# Patient Record
Sex: FEMALE | Race: BLACK OR AFRICAN AMERICAN | NOT HISPANIC OR LATINO | URBAN - METROPOLITAN AREA
[De-identification: names, ages, dates, MRNs, and addresses within clinical notes are randomized per-mention and may not be internally consistent; named-entity substitution may affect disease eponyms.]

---

## 2021-11-24 ENCOUNTER — PATIENT MESSAGE (OUTPATIENT)
Dept: INTERNAL MEDICINE | Facility: CLINIC | Age: 36
End: 2021-11-24
Payer: MEDICAID

## 2021-12-06 ENCOUNTER — PATIENT MESSAGE (OUTPATIENT)
Dept: INTERNAL MEDICINE | Facility: CLINIC | Age: 36
End: 2021-12-06
Payer: MEDICAID

## 2022-08-29 ENCOUNTER — OUTSIDE PLACE OF SERVICE (OUTPATIENT)
Dept: URGENT CARE | Facility: CLINIC | Age: 37
End: 2022-08-29
Payer: MEDICAID

## 2022-08-29 DIAGNOSIS — U07.1 COVID-19: Primary | ICD-10-CM

## 2022-08-29 DIAGNOSIS — R05.1 ACUTE COUGH: ICD-10-CM

## 2022-08-29 PROCEDURE — 99213 OFFICE O/P EST LOW 20 MIN: CPT | Mod: 95,,, | Performed by: NURSE PRACTITIONER

## 2022-08-29 PROCEDURE — 99213 PR OFFICE/OUTPT VISIT, EST, LEVL III, 20-29 MIN: ICD-10-PCS | Mod: 95,,, | Performed by: NURSE PRACTITIONER

## 2023-04-13 ENCOUNTER — TELEPHONE (OUTPATIENT)
Dept: OBSTETRICS AND GYNECOLOGY | Facility: CLINIC | Age: 38
End: 2023-04-13
Payer: COMMERCIAL

## 2023-04-13 NOTE — TELEPHONE ENCOUNTER
Called pt confirmed pt identifiers, pt stated she had a pregnancy confirmation at [facility] in women's health, stated she had an appointment but it was cancelled b/c a nurse told her she would need her paperwork before having her initial ob appointment, pt stated she now has her paperwork and wanted to know if there was anything else she needed to bring informed pt that if there is any additional paperwork the provider may need she will let her know.pt verbalized understanding

## 2023-04-14 ENCOUNTER — INITIAL PRENATAL (OUTPATIENT)
Dept: OBSTETRICS AND GYNECOLOGY | Facility: CLINIC | Age: 38
End: 2023-04-14
Payer: COMMERCIAL

## 2023-04-14 ENCOUNTER — PATIENT MESSAGE (OUTPATIENT)
Dept: ADMINISTRATIVE | Facility: OTHER | Age: 38
End: 2023-04-14
Payer: COMMERCIAL

## 2023-04-14 VITALS — WEIGHT: 126.31 LBS | SYSTOLIC BLOOD PRESSURE: 100 MMHG | DIASTOLIC BLOOD PRESSURE: 74 MMHG

## 2023-04-14 DIAGNOSIS — R39.15 URGENCY OF URINATION: ICD-10-CM

## 2023-04-14 DIAGNOSIS — O09.522 AMA (ADVANCED MATERNAL AGE) MULTIGRAVIDA 35+, SECOND TRIMESTER: Primary | ICD-10-CM

## 2023-04-14 DIAGNOSIS — Z36.89 ENCOUNTER FOR FETAL ANATOMIC SURVEY: ICD-10-CM

## 2023-04-14 DIAGNOSIS — Z3A.19 19 WEEKS GESTATION OF PREGNANCY: ICD-10-CM

## 2023-04-14 DIAGNOSIS — Z87.440 HISTORY OF UTI: ICD-10-CM

## 2023-04-14 PROCEDURE — 0500F PR INITIAL PRENATAL CARE VISIT: ICD-10-PCS | Mod: CPTII,S$GLB,, | Performed by: MIDWIFE

## 2023-04-14 PROCEDURE — 87086 URINE CULTURE/COLONY COUNT: CPT | Performed by: MIDWIFE

## 2023-04-14 PROCEDURE — 0500F INITIAL PRENATAL CARE VISIT: CPT | Mod: CPTII,S$GLB,, | Performed by: MIDWIFE

## 2023-04-14 PROCEDURE — 99999 PR PBB SHADOW E&M-EST. PATIENT-LVL II: CPT | Mod: PBBFAC,,, | Performed by: MIDWIFE

## 2023-04-14 PROCEDURE — 99999 PR PBB SHADOW E&M-EST. PATIENT-LVL II: ICD-10-PCS | Mod: PBBFAC,,, | Performed by: MIDWIFE

## 2023-04-14 RX ORDER — PHENAZOPYRIDINE HYDROCHLORIDE 200 MG/1
200 TABLET, FILM COATED ORAL
Qty: 20 TABLET | Refills: 0 | Status: SHIPPED | OUTPATIENT
Start: 2023-04-14 | End: 2023-04-21

## 2023-04-14 RX ORDER — ONDANSETRON 4 MG/1
4 TABLET, ORALLY DISINTEGRATING ORAL EVERY 8 HOURS PRN
Status: ON HOLD | COMMUNITY
Start: 2023-03-18 | End: 2023-09-16 | Stop reason: HOSPADM

## 2023-04-14 RX ORDER — ALPRAZOLAM 0.25 MG/1
0.25 TABLET ORAL
COMMUNITY
Start: 2022-11-23 | End: 2023-05-30

## 2023-04-14 RX ORDER — ASPIRIN 81 MG/1
81 TABLET ORAL
Status: ON HOLD | COMMUNITY
Start: 2023-04-04 | End: 2023-09-16 | Stop reason: HOSPADM

## 2023-04-14 NOTE — PROGRESS NOTES
Pt transfer from [facility], Dr Karin Joyner, records in media, GC/CT neg, Genetic screening neg, Hep C neg, PAPnormal, HPV neg, , B+, 11.3/34.9, RPR-NR, Rubella-positive, hep B neg:  Pt c/o of urinary urgency and pain, she went to the office and was told she probably had a kidney stone and should go to the assessment center, pt told she did not have a kidney stone that she had a UTI, took meds but is still feeling pain, it got better for a while but is feeling bad again, pyridium given and urine culture sent, pt has not had her anatomy scan yet, will schedule in the next 2 weeks    
detailed exam

## 2023-04-16 LAB
BACTERIA UR CULT: NORMAL
BACTERIA UR CULT: NORMAL

## 2023-04-18 ENCOUNTER — PATIENT MESSAGE (OUTPATIENT)
Dept: OTHER | Facility: OTHER | Age: 38
End: 2023-04-18
Payer: COMMERCIAL

## 2023-04-19 ENCOUNTER — PATIENT MESSAGE (OUTPATIENT)
Dept: OBSTETRICS AND GYNECOLOGY | Facility: CLINIC | Age: 38
End: 2023-04-19
Payer: COMMERCIAL

## 2023-04-19 ENCOUNTER — PATIENT MESSAGE (OUTPATIENT)
Dept: OTHER | Facility: OTHER | Age: 38
End: 2023-04-19
Payer: COMMERCIAL

## 2023-04-19 DIAGNOSIS — R82.71 BACTERIURIA: Primary | ICD-10-CM

## 2023-04-19 RX ORDER — NITROFURANTOIN 25; 75 MG/1; MG/1
100 CAPSULE ORAL 2 TIMES DAILY
Qty: 14 CAPSULE | Refills: 0 | Status: SHIPPED | OUTPATIENT
Start: 2023-04-19 | End: 2023-04-26

## 2023-05-01 ENCOUNTER — PROCEDURE VISIT (OUTPATIENT)
Dept: OBSTETRICS AND GYNECOLOGY | Facility: CLINIC | Age: 38
End: 2023-05-01
Payer: COMMERCIAL

## 2023-05-01 ENCOUNTER — ROUTINE PRENATAL (OUTPATIENT)
Dept: OBSTETRICS AND GYNECOLOGY | Facility: CLINIC | Age: 38
End: 2023-05-01
Payer: COMMERCIAL

## 2023-05-01 VITALS — WEIGHT: 131.81 LBS | DIASTOLIC BLOOD PRESSURE: 72 MMHG | SYSTOLIC BLOOD PRESSURE: 108 MMHG

## 2023-05-01 DIAGNOSIS — Z36.89 ENCOUNTER FOR FETAL ANATOMIC SURVEY: ICD-10-CM

## 2023-05-01 DIAGNOSIS — Z3A.21 21 WEEKS GESTATION OF PREGNANCY: ICD-10-CM

## 2023-05-01 PROCEDURE — 0502F PR SUBSEQUENT PRENATAL CARE: ICD-10-PCS | Mod: CPTII,S$GLB,, | Performed by: ADVANCED PRACTICE MIDWIFE

## 2023-05-01 PROCEDURE — 0502F SUBSEQUENT PRENATAL CARE: CPT | Mod: CPTII,S$GLB,, | Performed by: ADVANCED PRACTICE MIDWIFE

## 2023-05-01 PROCEDURE — 76811 US OB/GYN PROCEDURE (VIEWPOINT): ICD-10-PCS | Mod: S$GLB,,, | Performed by: OBSTETRICS & GYNECOLOGY

## 2023-05-01 PROCEDURE — 76811 OB US DETAILED SNGL FETUS: CPT | Mod: S$GLB,,, | Performed by: OBSTETRICS & GYNECOLOGY

## 2023-05-01 PROCEDURE — 99999 PR PBB SHADOW E&M-EST. PATIENT-LVL III: CPT | Mod: PBBFAC,,, | Performed by: ADVANCED PRACTICE MIDWIFE

## 2023-05-01 PROCEDURE — 99999 PR PBB SHADOW E&M-EST. PATIENT-LVL III: ICD-10-PCS | Mod: PBBFAC,,, | Performed by: ADVANCED PRACTICE MIDWIFE

## 2023-05-01 NOTE — PROGRESS NOTES
37 y.o. female  at 21w5d   feeling flutters/FM, denies VB, LOF or cramping  Doing well without concerns   Reviewed anatomy US-normal fetal anatomy with no obvious abnormalities.  S=D. Normal amniotic fluid, normal placental location in posterior position, no evidence of previa. Normal appearing cervical length at 48.5. male gender. 3VC. EFW 30 %tile.   Genetic testing negative    21 weeks gestation of pregnancy    Baby Friendly Practices reviewed   Reviewed warning signs, normal FM,  labor precautions and how/when to call.  RTC x 4 wks, call or present sooner prn.

## 2023-05-02 PROBLEM — D25.9 UTERINE FIBROID DURING PREGNANCY, ANTEPARTUM: Status: ACTIVE | Noted: 2023-05-02

## 2023-05-02 PROBLEM — O34.10 UTERINE FIBROID DURING PREGNANCY, ANTEPARTUM: Status: ACTIVE | Noted: 2023-05-02

## 2023-05-17 ENCOUNTER — PATIENT MESSAGE (OUTPATIENT)
Dept: OTHER | Facility: OTHER | Age: 38
End: 2023-05-17
Payer: COMMERCIAL

## 2023-05-30 ENCOUNTER — ROUTINE PRENATAL (OUTPATIENT)
Dept: OBSTETRICS AND GYNECOLOGY | Facility: CLINIC | Age: 38
End: 2023-05-30
Payer: COMMERCIAL

## 2023-05-30 VITALS — DIASTOLIC BLOOD PRESSURE: 68 MMHG | WEIGHT: 139.75 LBS | SYSTOLIC BLOOD PRESSURE: 110 MMHG

## 2023-05-30 DIAGNOSIS — O09.522 AMA (ADVANCED MATERNAL AGE) MULTIGRAVIDA 35+, SECOND TRIMESTER: Primary | ICD-10-CM

## 2023-05-30 PROCEDURE — 99999 PR PBB SHADOW E&M-EST. PATIENT-LVL III: CPT | Mod: PBBFAC,,, | Performed by: ADVANCED PRACTICE MIDWIFE

## 2023-05-30 PROCEDURE — 0502F SUBSEQUENT PRENATAL CARE: CPT | Mod: CPTII,S$GLB,, | Performed by: ADVANCED PRACTICE MIDWIFE

## 2023-05-30 PROCEDURE — 0502F PR SUBSEQUENT PRENATAL CARE: ICD-10-PCS | Mod: CPTII,S$GLB,, | Performed by: ADVANCED PRACTICE MIDWIFE

## 2023-05-30 PROCEDURE — 99999 PR PBB SHADOW E&M-EST. PATIENT-LVL III: ICD-10-PCS | Mod: PBBFAC,,, | Performed by: ADVANCED PRACTICE MIDWIFE

## 2023-05-30 RX ORDER — FLUOXETINE HYDROCHLORIDE 40 MG/1
1 CAPSULE ORAL DAILY
COMMUNITY
Start: 2022-06-14 | End: 2023-06-20

## 2023-05-30 NOTE — PROGRESS NOTES
37 y.o. female  at 25w6d   Reports + FM, denies VB, LOF, or cramping  Doing well without concerns. Rx sent for prenatal vitamin       TW lbs since last visit   Discussed feeding options: breast   Breast pump Rx:  Done   Encouraged patient to attend [facility]s Prenatal Breastfeeding Class.    Reviewed upcoming 28wk labs,(B+) and orders placed  Tdap handout provided and explained    AMA (advanced maternal age) multigravida 35+, second trimester  -     prenatal vit103-iron fum-folic () 27 mg iron- 1 mg Tab; Take 1 tablet by mouth once daily.  Dispense: 30 tablet; Refill: 11  -     CBC Without Differential; Future; Expected date: 2023  -     Rapid HIV; Future; Expected date: 2023  -     RPR; Future; Expected date: 2023  -     OB Glucose Screen; Future; Expected date: 2023         Reviewed warning signs, normal FM,  labor precautions and how/when to call.  RTC x 4 wks, call or present sooner prn.

## 2023-05-30 NOTE — PATIENT INSTRUCTIONS
"  Frequently Asked Questions for Pregnant Women Concerning Tdap Vaccination    What is pertussis (whooping cough)?  Pertussis (also called whooping cough) is a highly contagious disease that causes severe coughing. People with pertussis may make a "whooping" sound when they try to breathe and gasp for air. In newborns (birth to 1 month), pertussis can be life threatening. Recent outbreaks have shown that infants younger than 3 months are at very high risk of severe infection.     What is Tdap?  The tetanus toxoid, reduced diphtheria toxoid, and acellular pertussis (Tdap) vaccine is used to prevent three infections: 1) tetanus, 2) diptheria, and 3) pertussis.    I am pregnant. Should I get a Tdap shot?  Yes. All pregnant women should get a Tdap shot in the 3rd trimester, preferably between 27 weeks and 36 weeks of pregnancy. The Tdap shot is an effective and safe way to protect you and your baby from serious illness and complications of pertussis. You should get a Tdap shot during each pregnancy.    Is it safe to get the Tdap shot during pregnancy?  Yes. There are no theoretical or proven concerns about the safety of the Tdap vaccine (or other inactivated vaccines like Tdap) during pregnancy. The shot is safe when given to pregnant women.     During which trimester is it safe to get a Tdap shot?  It is safe to get the Tdap shot during all three trimesters of pregnancy. Experts recommend that you get Tdap during the 3rd trimester (preferably between 27 weeks and 36 weeks of pregnancy). This gives your  the most protection. The shot causes you to make antibodies against pertussis. These antibiotics are passed to the fetus. They protect your  until he or she begins to get vaccines against pertussis at 2 months of age.    Can newborns be vaccinated against pertussis?  No. Newborns cannot start their vaccine series against pertussis until they are 2 months of age because the vaccine does not work in the " "first few weeks of life. This is partly why newborns are at a higher risk of getting pertussis and becoming very ill.    What else can I do to protect my baby against pertussis?  Getting your Tdap shot is the most important step in protecting yourself and your baby against pertussis. It also is important that all family members and caregivers are up-to-date with their vaccines. If they need the Tdap shot, they should get it at least 2 weeks before having contact with your . This makes a safety "cocoon" of vaccinated caregivers around your baby.    I am breastfeeding my baby. Is it safe to get the Tdap vaccine?  Yes. The Tdap shot can safely be given to breastfeeding women if they did not get the Tdap shot during pregnancy and have never received the Tdap shot before.    I did not get my Tdap shot during pregnancy. Do I still need to get the vaccine?  If you have never gotten the Tdap vaccine and you do not get the shot during pregnancy, be sure to get the vaccine right after you give birth, before you leave the hospital or birthing center. It will take about 2 weeks for your body to make protective antibodies in response to the vaccine. Once these antibodies are made, you are likely to give pertussis to your . But remember, your baby still will be at risk of catching whooping cough from others.    I got a Tdap shot during a past pregnancy. Do I need to get the shot again during this pregnancy?  Yes. All pregnant women should get a Tdap shot during each pregnancy, preferably between 27 weeks and 36 weeks of pregnancy. This time frame is recommended because it gives the most protection to the pregnant woman and the fetus. It appears to maximize the antibodies present in the  at birth.    I received a Tdap shot early in this pregnancy, before 27-36 weeks of pregnancy. Do I need to get another Tdap shot between 27 weeks and 36 weeks of pregnancy?  A pregnant woman does not need to get the Tdap shot " later in the same pregnancy if she got the shot in the first or second trimester.     Can I get the Tdap vaccine and flu vaccine at the same time?  Yes. You can get more than one vaccine in the same visit.    What is the difference between Tdap, Td, and DTaP?  Children receive the diphtheria and tetanus toxoids and acellular pertussis (DTaP) vaccine. Teenagers and adults are given the Tdap vaccine as a booster to the DTaP they got as children. Adults receive the tetanus and diphtheria (Td) vaccine every 10 years to protect against tetanus and diphtheria. Td does not protect against pertussis.     RESOURCES  www.acog.org  www.immunizationforwomen.org  https://www.cdc.gov/vaccines/pregnancy/index.html  www.[facility].org

## 2023-05-31 ENCOUNTER — PATIENT MESSAGE (OUTPATIENT)
Dept: OTHER | Facility: OTHER | Age: 38
End: 2023-05-31
Payer: COMMERCIAL

## 2023-06-14 ENCOUNTER — PATIENT MESSAGE (OUTPATIENT)
Dept: OTHER | Facility: OTHER | Age: 38
End: 2023-06-14
Payer: COMMERCIAL

## 2023-06-20 ENCOUNTER — ROUTINE PRENATAL (OUTPATIENT)
Dept: OBSTETRICS AND GYNECOLOGY | Facility: CLINIC | Age: 38
End: 2023-06-20
Payer: COMMERCIAL

## 2023-06-20 ENCOUNTER — LAB VISIT (OUTPATIENT)
Dept: LAB | Facility: HOSPITAL | Age: 38
End: 2023-06-20
Attending: INTERNAL MEDICINE
Payer: COMMERCIAL

## 2023-06-20 ENCOUNTER — TELEPHONE (OUTPATIENT)
Dept: OBSTETRICS AND GYNECOLOGY | Facility: CLINIC | Age: 38
End: 2023-06-20

## 2023-06-20 VITALS — SYSTOLIC BLOOD PRESSURE: 104 MMHG | DIASTOLIC BLOOD PRESSURE: 64 MMHG | WEIGHT: 145.5 LBS

## 2023-06-20 DIAGNOSIS — O09.522 AMA (ADVANCED MATERNAL AGE) MULTIGRAVIDA 35+, SECOND TRIMESTER: Primary | ICD-10-CM

## 2023-06-20 DIAGNOSIS — Z3A.28 28 WEEKS GESTATION OF PREGNANCY: ICD-10-CM

## 2023-06-20 DIAGNOSIS — Z3A.28 28 WEEKS GESTATION OF PREGNANCY: Primary | ICD-10-CM

## 2023-06-20 DIAGNOSIS — O09.522 AMA (ADVANCED MATERNAL AGE) MULTIGRAVIDA 35+, SECOND TRIMESTER: ICD-10-CM

## 2023-06-20 DIAGNOSIS — D25.9 UTERINE FIBROID DURING PREGNANCY, ANTEPARTUM: ICD-10-CM

## 2023-06-20 DIAGNOSIS — O34.10 UTERINE FIBROID DURING PREGNANCY, ANTEPARTUM: ICD-10-CM

## 2023-06-20 LAB
ERYTHROCYTE [DISTWIDTH] IN BLOOD BY AUTOMATED COUNT: 16.7 % (ref 11.5–14.5)
GLUCOSE SERPL-MCNC: 156 MG/DL (ref 70–140)
HCT VFR BLD AUTO: 34 % (ref 37–48.5)
HGB BLD-MCNC: 10.8 G/DL (ref 12–16)
HIV 1+2 AB+HIV1 P24 AG SERPL QL IA: NORMAL
MCH RBC QN AUTO: 25.5 PG (ref 27–31)
MCHC RBC AUTO-ENTMCNC: 31.8 G/DL (ref 32–36)
MCV RBC AUTO: 80 FL (ref 82–98)
PLATELET # BLD AUTO: 328 K/UL (ref 150–450)
PMV BLD AUTO: 11.1 FL (ref 9.2–12.9)
RBC # BLD AUTO: 4.24 M/UL (ref 4–5.4)
WBC # BLD AUTO: 7.78 K/UL (ref 3.9–12.7)

## 2023-06-20 PROCEDURE — 85027 COMPLETE CBC AUTOMATED: CPT | Performed by: ADVANCED PRACTICE MIDWIFE

## 2023-06-20 PROCEDURE — 0502F PR SUBSEQUENT PRENATAL CARE: ICD-10-PCS | Mod: CPTII,S$GLB,, | Performed by: MIDWIFE

## 2023-06-20 PROCEDURE — 86592 SYPHILIS TEST NON-TREP QUAL: CPT | Performed by: ADVANCED PRACTICE MIDWIFE

## 2023-06-20 PROCEDURE — 82950 GLUCOSE TEST: CPT | Performed by: ADVANCED PRACTICE MIDWIFE

## 2023-06-20 PROCEDURE — 99999 PR PBB SHADOW E&M-EST. PATIENT-LVL III: CPT | Mod: PBBFAC,,, | Performed by: MIDWIFE

## 2023-06-20 PROCEDURE — 87389 HIV-1 AG W/HIV-1&-2 AB AG IA: CPT | Performed by: MIDWIFE

## 2023-06-20 PROCEDURE — 36415 COLL VENOUS BLD VENIPUNCTURE: CPT | Performed by: ADVANCED PRACTICE MIDWIFE

## 2023-06-20 PROCEDURE — 99999 PR PBB SHADOW E&M-EST. PATIENT-LVL III: ICD-10-PCS | Mod: PBBFAC,,, | Performed by: MIDWIFE

## 2023-06-20 PROCEDURE — 0502F SUBSEQUENT PRENATAL CARE: CPT | Mod: CPTII,S$GLB,, | Performed by: MIDWIFE

## 2023-06-20 NOTE — PROGRESS NOTES
38 y.o. female  at 28w6d   Reports + FM, denies VB, LOF or CTX  Doing well, some T3 c/o, rec made  TW lbs   28wk labs today B+  Tdap at [place]  Reviewed warning signs, normal FKCs,  labor precautions and how/when to call.  RTC x 2 wks, call or present sooner prn.

## 2023-06-21 LAB — RPR SER QL: NORMAL

## 2023-06-22 ENCOUNTER — PATIENT MESSAGE (OUTPATIENT)
Dept: OBSTETRICS AND GYNECOLOGY | Facility: CLINIC | Age: 38
End: 2023-06-22
Payer: COMMERCIAL

## 2023-06-23 DIAGNOSIS — O99.810 ABNORMAL GLUCOSE AFFECTING PREGNANCY: Primary | ICD-10-CM

## 2023-06-23 NOTE — TELEPHONE ENCOUNTER
Called pt confirmed pt identifiers scheduled pt for 3 hr GTT at [facility] for next Thursday (per pt) pt verbalized understanding.

## 2023-06-28 ENCOUNTER — PATIENT MESSAGE (OUTPATIENT)
Dept: OTHER | Facility: OTHER | Age: 38
End: 2023-06-28
Payer: COMMERCIAL

## 2023-06-28 ENCOUNTER — TELEPHONE (OUTPATIENT)
Dept: OBSTETRICS AND GYNECOLOGY | Facility: CLINIC | Age: 38
End: 2023-06-28
Payer: COMMERCIAL

## 2023-06-29 ENCOUNTER — LAB VISIT (OUTPATIENT)
Dept: LAB | Facility: HOSPITAL | Age: 38
End: 2023-06-29
Attending: ADVANCED PRACTICE MIDWIFE
Payer: COMMERCIAL

## 2023-06-29 DIAGNOSIS — O99.810 ABNORMAL GLUCOSE AFFECTING PREGNANCY: ICD-10-CM

## 2023-06-29 LAB
GLUCOSE SERPL-MCNC: 123 MG/DL
GLUCOSE SERPL-MCNC: 126 MG/DL
GLUCOSE SERPL-MCNC: 130 MG/DL
GLUCOSE SERPL-MCNC: 64 MG/DL (ref 70–110)

## 2023-06-29 PROCEDURE — 82951 GLUCOSE TOLERANCE TEST (GTT): CPT | Performed by: ADVANCED PRACTICE MIDWIFE

## 2023-06-29 PROCEDURE — 36415 COLL VENOUS BLD VENIPUNCTURE: CPT | Performed by: ADVANCED PRACTICE MIDWIFE

## 2023-06-30 ENCOUNTER — PATIENT MESSAGE (OUTPATIENT)
Dept: OBSTETRICS AND GYNECOLOGY | Facility: CLINIC | Age: 38
End: 2023-06-30
Payer: COMMERCIAL

## 2023-07-03 ENCOUNTER — TELEPHONE (OUTPATIENT)
Dept: OBSTETRICS AND GYNECOLOGY | Facility: CLINIC | Age: 38
End: 2023-07-03
Payer: COMMERCIAL

## 2023-07-03 NOTE — TELEPHONE ENCOUNTER
----- Message from Joanna Dean sent at 6/30/2023  1:29 PM CDT -----  Contact: concha Silva is calling to get results from glucose reading. Please call her back at [phone].      Thanks  DD

## 2023-07-04 ENCOUNTER — PATIENT MESSAGE (OUTPATIENT)
Dept: OBSTETRICS AND GYNECOLOGY | Facility: CLINIC | Age: 38
End: 2023-07-04
Payer: COMMERCIAL

## 2023-07-12 ENCOUNTER — PATIENT MESSAGE (OUTPATIENT)
Dept: OTHER | Facility: OTHER | Age: 38
End: 2023-07-12
Payer: COMMERCIAL

## 2023-07-13 ENCOUNTER — ROUTINE PRENATAL (OUTPATIENT)
Dept: OBSTETRICS AND GYNECOLOGY | Facility: CLINIC | Age: 38
End: 2023-07-13
Payer: COMMERCIAL

## 2023-07-13 VITALS — DIASTOLIC BLOOD PRESSURE: 68 MMHG | SYSTOLIC BLOOD PRESSURE: 120 MMHG | WEIGHT: 150.13 LBS

## 2023-07-13 DIAGNOSIS — O09.522 AMA (ADVANCED MATERNAL AGE) MULTIGRAVIDA 35+, SECOND TRIMESTER: ICD-10-CM

## 2023-07-13 DIAGNOSIS — Z23 NEED FOR TDAP VACCINATION: Primary | ICD-10-CM

## 2023-07-13 DIAGNOSIS — R10.2 PELVIC PAIN IN PREGNANCY, ANTEPARTUM, THIRD TRIMESTER: ICD-10-CM

## 2023-07-13 DIAGNOSIS — O26.893 PELVIC PAIN IN PREGNANCY, ANTEPARTUM, THIRD TRIMESTER: ICD-10-CM

## 2023-07-13 PROBLEM — Z3A.21 21 WEEKS GESTATION OF PREGNANCY: Status: RESOLVED | Noted: 2023-05-01 | Resolved: 2023-07-13

## 2023-07-13 PROCEDURE — 0502F PR SUBSEQUENT PRENATAL CARE: ICD-10-PCS | Mod: CPTII,S$GLB,, | Performed by: ADVANCED PRACTICE MIDWIFE

## 2023-07-13 PROCEDURE — 99999 PR PBB SHADOW E&M-EST. PATIENT-LVL III: ICD-10-PCS | Mod: PBBFAC,,, | Performed by: ADVANCED PRACTICE MIDWIFE

## 2023-07-13 PROCEDURE — 99999 PR PBB SHADOW E&M-EST. PATIENT-LVL III: CPT | Mod: PBBFAC,,, | Performed by: ADVANCED PRACTICE MIDWIFE

## 2023-07-13 PROCEDURE — 0502F SUBSEQUENT PRENATAL CARE: CPT | Mod: CPTII,S$GLB,, | Performed by: ADVANCED PRACTICE MIDWIFE

## 2023-07-13 NOTE — PROGRESS NOTES
38 y.o. female  at 32w1d   Reports + FM, denies VB, LOF or CTX  Doing well overall, discussed options for pelvic floor PT. Referral placed.    TW lbs   Reviewed 28wk lab results (B pos)  No Anemia  Failed 1 hour GTT-passed 3hr GTT  Tdap desires next visit      AMA (advanced maternal age) multigravida 35+, second trimester  -     US OB/GYN Procedure ([facility])-Future; Future-growth US next week for AMA  Working on birth plan-will bring to next visit to discuss  Has a     Pelvic pain in pregnancy, antepartum, third trimester  -     Ambulatory referral/consult to Physical/Occupational Therapy; Future; Expected date: 2023    Reviewed warning signs, normal FKCs,  labor precautions and how/when to call.  RTC x 1 wk, call or present sooner prn.

## 2023-07-17 ENCOUNTER — PROCEDURE VISIT (OUTPATIENT)
Dept: OBSTETRICS AND GYNECOLOGY | Facility: CLINIC | Age: 38
End: 2023-07-17
Payer: COMMERCIAL

## 2023-07-17 DIAGNOSIS — O09.522 AMA (ADVANCED MATERNAL AGE) MULTIGRAVIDA 35+, SECOND TRIMESTER: ICD-10-CM

## 2023-07-17 PROCEDURE — 76816 OB US FOLLOW-UP PER FETUS: CPT | Mod: S$GLB,,, | Performed by: OBSTETRICS & GYNECOLOGY

## 2023-07-17 PROCEDURE — 76816 US OB/GYN PROCEDURE (VIEWPOINT): ICD-10-PCS | Mod: S$GLB,,, | Performed by: OBSTETRICS & GYNECOLOGY

## 2023-07-31 ENCOUNTER — ROUTINE PRENATAL (OUTPATIENT)
Dept: OBSTETRICS AND GYNECOLOGY | Facility: CLINIC | Age: 38
End: 2023-07-31
Payer: COMMERCIAL

## 2023-07-31 VITALS — DIASTOLIC BLOOD PRESSURE: 64 MMHG | SYSTOLIC BLOOD PRESSURE: 102 MMHG | WEIGHT: 149.94 LBS

## 2023-07-31 DIAGNOSIS — O09.522 AMA (ADVANCED MATERNAL AGE) MULTIGRAVIDA 35+, SECOND TRIMESTER: ICD-10-CM

## 2023-07-31 DIAGNOSIS — Z36.89 ENCOUNTER FOR ULTRASOUND TO ASSESS FETAL GROWTH: Primary | ICD-10-CM

## 2023-07-31 PROCEDURE — 99999 PR PBB SHADOW E&M-EST. PATIENT-LVL III: ICD-10-PCS | Mod: PBBFAC,,, | Performed by: ADVANCED PRACTICE MIDWIFE

## 2023-07-31 PROCEDURE — 0502F PR SUBSEQUENT PRENATAL CARE: ICD-10-PCS | Mod: CPTII,S$GLB,, | Performed by: ADVANCED PRACTICE MIDWIFE

## 2023-07-31 PROCEDURE — 0502F SUBSEQUENT PRENATAL CARE: CPT | Mod: CPTII,S$GLB,, | Performed by: ADVANCED PRACTICE MIDWIFE

## 2023-07-31 PROCEDURE — 99999 PR PBB SHADOW E&M-EST. PATIENT-LVL III: CPT | Mod: PBBFAC,,, | Performed by: ADVANCED PRACTICE MIDWIFE

## 2023-08-02 ENCOUNTER — PATIENT MESSAGE (OUTPATIENT)
Dept: OTHER | Facility: OTHER | Age: 38
End: 2023-08-02
Payer: COMMERCIAL

## 2023-08-11 NOTE — PROGRESS NOTES
38 y.o. female  at 36w2d   Reports + FM, denies VB, LOF or regular CTX  Doing well without concerns   present with patient today.  Baby friendly practices reviewed.  Has rough draft of birth plan  TW lbs   GBS handout provided and reviewed    Encounter for ultrasound to assess fetal growth  -     US OB/GYN Procedure ([facility])-Future; Future; Expected date: 2023    AMA (advanced maternal age) multigravida 35+, second trimester     Reviewed warning signs, normal FKCs, labor precautions and how/when to call.  RTC x 2 wks, call or present sooner prn.

## 2023-08-15 ENCOUNTER — ROUTINE PRENATAL (OUTPATIENT)
Dept: OBSTETRICS AND GYNECOLOGY | Facility: CLINIC | Age: 38
End: 2023-08-15
Payer: COMMERCIAL

## 2023-08-15 ENCOUNTER — PATIENT MESSAGE (OUTPATIENT)
Dept: OBSTETRICS AND GYNECOLOGY | Facility: CLINIC | Age: 38
End: 2023-08-15

## 2023-08-15 VITALS — WEIGHT: 156.31 LBS | SYSTOLIC BLOOD PRESSURE: 104 MMHG | DIASTOLIC BLOOD PRESSURE: 68 MMHG

## 2023-08-15 DIAGNOSIS — R76.8 ANA POSITIVE: ICD-10-CM

## 2023-08-15 PROCEDURE — 99999 PR PBB SHADOW E&M-EST. PATIENT-LVL III: ICD-10-PCS | Mod: PBBFAC,,, | Performed by: ADVANCED PRACTICE MIDWIFE

## 2023-08-15 PROCEDURE — 0502F SUBSEQUENT PRENATAL CARE: CPT | Mod: CPTII,S$GLB,, | Performed by: ADVANCED PRACTICE MIDWIFE

## 2023-08-15 PROCEDURE — 99999 PR PBB SHADOW E&M-EST. PATIENT-LVL III: CPT | Mod: PBBFAC,,, | Performed by: ADVANCED PRACTICE MIDWIFE

## 2023-08-15 PROCEDURE — 0502F PR SUBSEQUENT PRENATAL CARE: ICD-10-PCS | Mod: CPTII,S$GLB,, | Performed by: ADVANCED PRACTICE MIDWIFE

## 2023-08-16 ENCOUNTER — PATIENT MESSAGE (OUTPATIENT)
Dept: OBSTETRICS AND GYNECOLOGY | Facility: CLINIC | Age: 38
End: 2023-08-16
Payer: COMMERCIAL

## 2023-08-17 ENCOUNTER — PROCEDURE VISIT (OUTPATIENT)
Dept: OBSTETRICS AND GYNECOLOGY | Facility: CLINIC | Age: 38
End: 2023-08-17
Payer: COMMERCIAL

## 2023-08-17 ENCOUNTER — ROUTINE PRENATAL (OUTPATIENT)
Dept: OBSTETRICS AND GYNECOLOGY | Facility: CLINIC | Age: 38
End: 2023-08-17
Payer: COMMERCIAL

## 2023-08-17 VITALS — SYSTOLIC BLOOD PRESSURE: 104 MMHG | WEIGHT: 154.31 LBS | DIASTOLIC BLOOD PRESSURE: 74 MMHG

## 2023-08-17 DIAGNOSIS — Z36.89 ENCOUNTER FOR ULTRASOUND TO ASSESS FETAL GROWTH: ICD-10-CM

## 2023-08-17 DIAGNOSIS — O09.522 AMA (ADVANCED MATERNAL AGE) MULTIGRAVIDA 35+, SECOND TRIMESTER: Primary | ICD-10-CM

## 2023-08-17 DIAGNOSIS — O35.EXX0 PYELECTASIS OF FETUS ON PRENATAL ULTRASOUND: ICD-10-CM

## 2023-08-17 DIAGNOSIS — R10.2 PELVIC PAIN: ICD-10-CM

## 2023-08-17 PROCEDURE — 76819 FETAL BIOPHYS PROFIL W/O NST: CPT | Mod: S$GLB,,, | Performed by: OBSTETRICS & GYNECOLOGY

## 2023-08-17 PROCEDURE — 0502F SUBSEQUENT PRENATAL CARE: CPT | Mod: CPTII,S$GLB,, | Performed by: ADVANCED PRACTICE MIDWIFE

## 2023-08-17 PROCEDURE — 99999 PR PBB SHADOW E&M-EST. PATIENT-LVL III: CPT | Mod: PBBFAC,,, | Performed by: ADVANCED PRACTICE MIDWIFE

## 2023-08-17 PROCEDURE — 99999 PR PBB SHADOW E&M-EST. PATIENT-LVL III: ICD-10-PCS | Mod: PBBFAC,,, | Performed by: ADVANCED PRACTICE MIDWIFE

## 2023-08-17 PROCEDURE — 76819 US OB/GYN PROCEDURE (VIEWPOINT): ICD-10-PCS | Mod: S$GLB,,, | Performed by: OBSTETRICS & GYNECOLOGY

## 2023-08-17 PROCEDURE — 87081 CULTURE SCREEN ONLY: CPT | Performed by: ADVANCED PRACTICE MIDWIFE

## 2023-08-17 PROCEDURE — 0502F PR SUBSEQUENT PRENATAL CARE: ICD-10-PCS | Mod: CPTII,S$GLB,, | Performed by: ADVANCED PRACTICE MIDWIFE

## 2023-08-17 PROCEDURE — 76816 US OB/GYN PROCEDURE (VIEWPOINT): ICD-10-PCS | Mod: S$GLB,,, | Performed by: OBSTETRICS & GYNECOLOGY

## 2023-08-17 PROCEDURE — 76816 OB US FOLLOW-UP PER FETUS: CPT | Mod: S$GLB,,, | Performed by: OBSTETRICS & GYNECOLOGY

## 2023-08-17 NOTE — PROGRESS NOTES
38 y.o. female  at 37w1d  Reports + FM, denies VB, LOF or regular CTX  Doing well without concerns. Would like new referral for pelvic PT, placed today.     TW lbs   GBS collected today   The skin of the suprapubic region was evaluated and appears intact.    VE per pt request  Closed/60/-2    US today for growth/position: cephalic, EFW 40%tile (6#11oz), normal MVP 5.3cm  Bilateral renal pelvis dilation noted-will notify peds after delivery.     AMA (advanced maternal age) multigravida 35+, second trimester  -     Strep B Screen, Vaginal / Rectal         Reviewed warning signs, normal FKCs, labor precautions and how/when to call.  RTC x 1 wk, call or present sooner prn.

## 2023-08-19 PROBLEM — O99.820 GBS (GROUP B STREPTOCOCCUS CARRIER), +RV CULTURE, CURRENTLY PREGNANT: Status: ACTIVE | Noted: 2023-08-19

## 2023-08-19 LAB — BACTERIA SPEC AEROBE CULT: ABNORMAL

## 2023-08-21 DIAGNOSIS — O09.522 AMA (ADVANCED MATERNAL AGE) MULTIGRAVIDA 35+, SECOND TRIMESTER: Primary | ICD-10-CM

## 2023-08-22 ENCOUNTER — PATIENT MESSAGE (OUTPATIENT)
Dept: OBSTETRICS AND GYNECOLOGY | Facility: CLINIC | Age: 38
End: 2023-08-22
Payer: COMMERCIAL

## 2023-08-23 ENCOUNTER — TELEPHONE (OUTPATIENT)
Dept: OBSTETRICS AND GYNECOLOGY | Facility: CLINIC | Age: 38
End: 2023-08-23
Payer: COMMERCIAL

## 2023-08-23 NOTE — TELEPHONE ENCOUNTER
Called patient and she wants to know why BPP is ordered and scheduled for tomorrow?  Advised patient message would be sent to provider.

## 2023-08-23 NOTE — TELEPHONE ENCOUNTER
----- Message from Rolando Deng sent at 8/23/2023 11:06 AM CDT -----  Contact: patient  Betty Felipe would like a call back at [phone], in regards to her ultrasound on tomorrow.

## 2023-08-23 NOTE — PROGRESS NOTES
38 y.o. female  at 36w6d   Reports + FM, denies VB, LOF or regular CTX  Here today to have Maternity Leave paper work filled out.  Completed and provided to patient  TW lbs   GBS handout provided and reviewed.  Keep appt with US on     FREDY positive     Reviewed warning signs, normal FKCs, labor precautions and how/when to call.  RTC x 2 wks, call or present sooner prn.

## 2023-08-24 ENCOUNTER — ROUTINE PRENATAL (OUTPATIENT)
Dept: OBSTETRICS AND GYNECOLOGY | Facility: CLINIC | Age: 38
End: 2023-08-24
Payer: COMMERCIAL

## 2023-08-24 ENCOUNTER — PROCEDURE VISIT (OUTPATIENT)
Dept: OBSTETRICS AND GYNECOLOGY | Facility: CLINIC | Age: 38
End: 2023-08-24
Payer: COMMERCIAL

## 2023-08-24 VITALS — SYSTOLIC BLOOD PRESSURE: 104 MMHG | WEIGHT: 153.25 LBS | DIASTOLIC BLOOD PRESSURE: 72 MMHG

## 2023-08-24 DIAGNOSIS — O09.522 AMA (ADVANCED MATERNAL AGE) MULTIGRAVIDA 35+, SECOND TRIMESTER: ICD-10-CM

## 2023-08-24 DIAGNOSIS — O99.820 GBS (GROUP B STREPTOCOCCUS CARRIER), +RV CULTURE, CURRENTLY PREGNANT: ICD-10-CM

## 2023-08-24 DIAGNOSIS — O35.EXX0 PYELECTASIS OF FETUS ON PRENATAL ULTRASOUND: Primary | ICD-10-CM

## 2023-08-24 PROCEDURE — 99999 PR PBB SHADOW E&M-EST. PATIENT-LVL III: CPT | Mod: PBBFAC,,, | Performed by: ADVANCED PRACTICE MIDWIFE

## 2023-08-24 PROCEDURE — 99999 PR PBB SHADOW E&M-EST. PATIENT-LVL III: ICD-10-PCS | Mod: PBBFAC,,, | Performed by: ADVANCED PRACTICE MIDWIFE

## 2023-08-24 PROCEDURE — 76819 FETAL BIOPHYS PROFIL W/O NST: CPT | Mod: S$GLB,,, | Performed by: OBSTETRICS & GYNECOLOGY

## 2023-08-24 PROCEDURE — 0502F SUBSEQUENT PRENATAL CARE: CPT | Mod: CPTII,S$GLB,, | Performed by: ADVANCED PRACTICE MIDWIFE

## 2023-08-24 PROCEDURE — 76819 US OB/GYN PROCEDURE (VIEWPOINT): ICD-10-PCS | Mod: S$GLB,,, | Performed by: OBSTETRICS & GYNECOLOGY

## 2023-08-24 PROCEDURE — 0502F PR SUBSEQUENT PRENATAL CARE: ICD-10-PCS | Mod: CPTII,S$GLB,, | Performed by: ADVANCED PRACTICE MIDWIFE

## 2023-08-24 NOTE — PROGRESS NOTES
38 y.o. female  at 38w1d   Reports + FM, denies VB, LOF or regular CTX  Doing well without concerns. Feeling ok overall  Discussed recommendations for delivery at 39 weeks per Betty Delong would like to wait for spontaneous labor. Will continue weekly BPP to monitor JESSICA.     TW lbs     Pyelectasis of fetus on prenatal ultrasound  -     US OB/GYN Procedure ([facility])-Future; Standing    GBS (group B Streptococcus carrier), +RV culture, currently pregnant    AMA (advanced maternal age) multigravida 35+, second trimester  -     US OB/GYN Procedure ([facility])-Future; Standing  -BPP today , MVP 7.8, placenta posterior. Fibroid measures 3.5 x 3.3 x 3.9cm.        Reviewed warning signs, normal FKCs, labor precautions and how/when to call.  RTC x 1 wk, call or present sooner prn.

## 2023-08-28 ENCOUNTER — ROUTINE PRENATAL (OUTPATIENT)
Dept: OBSTETRICS AND GYNECOLOGY | Facility: CLINIC | Age: 38
End: 2023-08-28
Payer: COMMERCIAL

## 2023-08-28 ENCOUNTER — PROCEDURE VISIT (OUTPATIENT)
Dept: OBSTETRICS AND GYNECOLOGY | Facility: CLINIC | Age: 38
End: 2023-08-28
Payer: COMMERCIAL

## 2023-08-28 VITALS — WEIGHT: 156.75 LBS | DIASTOLIC BLOOD PRESSURE: 70 MMHG | SYSTOLIC BLOOD PRESSURE: 100 MMHG

## 2023-08-28 DIAGNOSIS — O35.EXX0 PYELECTASIS OF FETUS ON PRENATAL ULTRASOUND: ICD-10-CM

## 2023-08-28 DIAGNOSIS — D25.9 UTERINE FIBROID DURING PREGNANCY, ANTEPARTUM: ICD-10-CM

## 2023-08-28 DIAGNOSIS — O09.522 AMA (ADVANCED MATERNAL AGE) MULTIGRAVIDA 35+, SECOND TRIMESTER: ICD-10-CM

## 2023-08-28 DIAGNOSIS — O99.891 BACK PAIN IN PREGNANCY: ICD-10-CM

## 2023-08-28 DIAGNOSIS — O34.10 UTERINE FIBROID DURING PREGNANCY, ANTEPARTUM: ICD-10-CM

## 2023-08-28 DIAGNOSIS — M54.9 BACK PAIN IN PREGNANCY: ICD-10-CM

## 2023-08-28 DIAGNOSIS — O09.522 AMA (ADVANCED MATERNAL AGE) MULTIGRAVIDA 35+, SECOND TRIMESTER: Primary | ICD-10-CM

## 2023-08-28 PROCEDURE — 0502F SUBSEQUENT PRENATAL CARE: CPT | Mod: CPTII,S$GLB,, | Performed by: ADVANCED PRACTICE MIDWIFE

## 2023-08-28 PROCEDURE — 76819 FETAL BIOPHYS PROFIL W/O NST: CPT | Mod: S$GLB,,, | Performed by: OBSTETRICS & GYNECOLOGY

## 2023-08-28 PROCEDURE — 99999 PR PBB SHADOW E&M-EST. PATIENT-LVL III: ICD-10-PCS | Mod: PBBFAC,,, | Performed by: ADVANCED PRACTICE MIDWIFE

## 2023-08-28 PROCEDURE — 76819 US OB/GYN PROCEDURE (VIEWPOINT): ICD-10-PCS | Mod: S$GLB,,, | Performed by: OBSTETRICS & GYNECOLOGY

## 2023-08-28 PROCEDURE — 0502F PR SUBSEQUENT PRENATAL CARE: ICD-10-PCS | Mod: CPTII,S$GLB,, | Performed by: ADVANCED PRACTICE MIDWIFE

## 2023-08-28 PROCEDURE — 99999 PR PBB SHADOW E&M-EST. PATIENT-LVL III: CPT | Mod: PBBFAC,,, | Performed by: ADVANCED PRACTICE MIDWIFE

## 2023-08-28 RX ORDER — CYCLOBENZAPRINE HCL 10 MG
10 TABLET ORAL 3 TIMES DAILY PRN
Qty: 30 TABLET | Refills: 0 | Status: SHIPPED | OUTPATIENT
Start: 2023-08-28 | End: 2023-09-07

## 2023-08-28 NOTE — PROGRESS NOTES
38 y.o. female  at 38w5d   Reports + FM, denies VB, LOF or regular CTX  Doing well without concerns. Has noticed DFM over the last few days, however BPP 8/8 today and fetal movements felt on leopolds exam. Discussed [facility]KC precautions.   Having increase in lower back pain. Encouraged cat cow yoga and warm soaks. Flexaril sent PRN as well.     TW lbs     AMA (advanced maternal age) multigravida 35+, second trimester    Uterine fibroid during pregnancy, antepartum    Pyelectasis of fetus on prenatal ultrasound  -BPP 8/8, MVP 3.3. Still does not want IOL. Stressed precautions.        Reviewed warning signs, normal FKCs, labor precautions and how/when to call.  RTC x 1 wk, call or present sooner prn.

## 2023-09-06 ENCOUNTER — PROCEDURE VISIT (OUTPATIENT)
Dept: OBSTETRICS AND GYNECOLOGY | Facility: CLINIC | Age: 38
End: 2023-09-06
Payer: COMMERCIAL

## 2023-09-06 ENCOUNTER — ROUTINE PRENATAL (OUTPATIENT)
Dept: OBSTETRICS AND GYNECOLOGY | Facility: CLINIC | Age: 38
End: 2023-09-06
Payer: COMMERCIAL

## 2023-09-06 VITALS — WEIGHT: 162.5 LBS | DIASTOLIC BLOOD PRESSURE: 84 MMHG | SYSTOLIC BLOOD PRESSURE: 132 MMHG

## 2023-09-06 DIAGNOSIS — O35.EXX0 PYELECTASIS OF FETUS ON PRENATAL ULTRASOUND: ICD-10-CM

## 2023-09-06 DIAGNOSIS — O09.522 AMA (ADVANCED MATERNAL AGE) MULTIGRAVIDA 35+, SECOND TRIMESTER: ICD-10-CM

## 2023-09-06 DIAGNOSIS — O09.522 AMA (ADVANCED MATERNAL AGE) MULTIGRAVIDA 35+, SECOND TRIMESTER: Primary | ICD-10-CM

## 2023-09-06 LAB
CREAT UR-MCNC: 202 MG/DL (ref 15–325)
PROT UR-MCNC: 27 MG/DL (ref 0–15)
PROT/CREAT UR: 0.13 MG/G{CREAT} (ref 0–0.2)

## 2023-09-06 PROCEDURE — 76819 FETAL BIOPHYS PROFIL W/O NST: CPT | Mod: S$GLB,,, | Performed by: OBSTETRICS & GYNECOLOGY

## 2023-09-06 PROCEDURE — 99999 PR PBB SHADOW E&M-EST. PATIENT-LVL III: ICD-10-PCS | Mod: PBBFAC,,, | Performed by: ADVANCED PRACTICE MIDWIFE

## 2023-09-06 PROCEDURE — 99999 PR PBB SHADOW E&M-EST. PATIENT-LVL III: CPT | Mod: PBBFAC,,, | Performed by: ADVANCED PRACTICE MIDWIFE

## 2023-09-06 PROCEDURE — 0502F SUBSEQUENT PRENATAL CARE: CPT | Mod: CPTII,S$GLB,, | Performed by: ADVANCED PRACTICE MIDWIFE

## 2023-09-06 PROCEDURE — 84156 ASSAY OF PROTEIN URINE: CPT | Performed by: ADVANCED PRACTICE MIDWIFE

## 2023-09-06 PROCEDURE — 0502F PR SUBSEQUENT PRENATAL CARE: ICD-10-PCS | Mod: CPTII,S$GLB,, | Performed by: ADVANCED PRACTICE MIDWIFE

## 2023-09-06 PROCEDURE — 76819 US OB/GYN PROCEDURE (VIEWPOINT): ICD-10-PCS | Mod: S$GLB,,, | Performed by: OBSTETRICS & GYNECOLOGY

## 2023-09-06 RX ORDER — ONDANSETRON 4 MG/1
8 TABLET, ORALLY DISINTEGRATING ORAL EVERY 8 HOURS PRN
Status: CANCELLED | OUTPATIENT
Start: 2023-09-06

## 2023-09-06 RX ORDER — DIPHENOXYLATE HYDROCHLORIDE AND ATROPINE SULFATE 2.5; .025 MG/1; MG/1
2 TABLET ORAL EVERY 6 HOURS PRN
Status: CANCELLED | OUTPATIENT
Start: 2023-09-06

## 2023-09-06 RX ORDER — OXYTOCIN/RINGER'S LACTATE 30/500 ML
334 PLASTIC BAG, INJECTION (ML) INTRAVENOUS ONCE
Status: CANCELLED | OUTPATIENT
Start: 2023-09-06 | End: 2023-09-06

## 2023-09-06 RX ORDER — OXYTOCIN 10 [USP'U]/ML
10 INJECTION, SOLUTION INTRAMUSCULAR; INTRAVENOUS ONCE AS NEEDED
Status: CANCELLED | OUTPATIENT
Start: 2023-09-06 | End: 2035-02-02

## 2023-09-06 RX ORDER — TERBUTALINE SULFATE 1 MG/ML
0.25 INJECTION SUBCUTANEOUS
Status: CANCELLED | OUTPATIENT
Start: 2023-09-06

## 2023-09-06 RX ORDER — MISOPROSTOL 100 UG/1
800 TABLET ORAL ONCE AS NEEDED
Status: CANCELLED | OUTPATIENT
Start: 2023-09-06

## 2023-09-06 RX ORDER — LIDOCAINE HYDROCHLORIDE 10 MG/ML
10 INJECTION INFILTRATION; PERINEURAL ONCE AS NEEDED
Status: CANCELLED | OUTPATIENT
Start: 2023-09-06 | End: 2035-02-02

## 2023-09-06 RX ORDER — SODIUM CHLORIDE, SODIUM LACTATE, POTASSIUM CHLORIDE, CALCIUM CHLORIDE 600; 310; 30; 20 MG/100ML; MG/100ML; MG/100ML; MG/100ML
INJECTION, SOLUTION INTRAVENOUS CONTINUOUS
Status: CANCELLED | OUTPATIENT
Start: 2023-09-06

## 2023-09-06 RX ORDER — CALCIUM CARBONATE 200(500)MG
500 TABLET,CHEWABLE ORAL 3 TIMES DAILY PRN
Status: CANCELLED | OUTPATIENT
Start: 2023-09-06

## 2023-09-06 RX ORDER — MISOPROSTOL 100 UG/1
800 TABLET ORAL ONCE AS NEEDED
Status: CANCELLED | OUTPATIENT
Start: 2023-09-06 | End: 2035-02-02

## 2023-09-06 RX ORDER — CARBOPROST TROMETHAMINE 250 UG/ML
250 INJECTION, SOLUTION INTRAMUSCULAR
Status: CANCELLED | OUTPATIENT
Start: 2023-09-06

## 2023-09-06 RX ORDER — MISOPROSTOL 100 MCG
50 TABLET ORAL EVERY 4 HOURS PRN
Status: CANCELLED | OUTPATIENT
Start: 2023-09-06

## 2023-09-06 RX ORDER — MUPIROCIN 20 MG/G
OINTMENT TOPICAL
Status: CANCELLED | OUTPATIENT
Start: 2023-09-06

## 2023-09-06 RX ORDER — METHYLERGONOVINE MALEATE 0.2 MG/ML
200 INJECTION INTRAVENOUS
Status: CANCELLED | OUTPATIENT
Start: 2023-09-06

## 2023-09-06 RX ORDER — OXYTOCIN/RINGER'S LACTATE 30/500 ML
334 PLASTIC BAG, INJECTION (ML) INTRAVENOUS ONCE AS NEEDED
Status: CANCELLED | OUTPATIENT
Start: 2023-09-06 | End: 2035-02-02

## 2023-09-06 RX ORDER — PROCHLORPERAZINE EDISYLATE 5 MG/ML
5 INJECTION INTRAMUSCULAR; INTRAVENOUS EVERY 6 HOURS PRN
Status: CANCELLED | OUTPATIENT
Start: 2023-09-06

## 2023-09-06 RX ORDER — OXYTOCIN/RINGER'S LACTATE 30/500 ML
95 PLASTIC BAG, INJECTION (ML) INTRAVENOUS ONCE
Status: CANCELLED | OUTPATIENT
Start: 2023-09-06 | End: 2023-09-06

## 2023-09-06 RX ORDER — SIMETHICONE 80 MG
1 TABLET,CHEWABLE ORAL 4 TIMES DAILY PRN
Status: CANCELLED | OUTPATIENT
Start: 2023-09-06

## 2023-09-06 RX ORDER — OXYTOCIN/RINGER'S LACTATE 30/500 ML
95 PLASTIC BAG, INJECTION (ML) INTRAVENOUS ONCE AS NEEDED
Status: CANCELLED | OUTPATIENT
Start: 2023-09-06 | End: 2035-02-02

## 2023-09-06 NOTE — PROGRESS NOTES
38 y.o. female  at 40w0d   Reports + FM, denies VB, LOF or regular CTX  Doing well without concerns. Has some right sided swelling, BP slightly elevated for her today. No other s/s of pre-e, will do outpatient work up.   TW lbs       AMA (advanced maternal age) multigravida 35+, second trimester  -BPP today , vertex, MVP 3.6       Reviewed GBS + and repeat HIV/RPR -/-  Discussed postdates management and IOL - she prefers to await spontaneous labor if possible   BPPs discussed and ordered  Scheduled IOL at 40w5d on , orders in       Reviewed warning signs, normal FKCs, labor precautions and how/when to call.  RTC x 4 wks PP, call or present sooner prn.

## 2023-09-07 ENCOUNTER — PATIENT MESSAGE (OUTPATIENT)
Dept: OBSTETRICS AND GYNECOLOGY | Facility: CLINIC | Age: 38
End: 2023-09-07
Payer: COMMERCIAL

## 2023-09-11 ENCOUNTER — HOSPITAL ENCOUNTER (INPATIENT)
Facility: HOSPITAL | Age: 38
LOS: 5 days | Discharge: HOME | End: 2023-09-16
Attending: OBSTETRICS & GYNECOLOGY | Admitting: OBSTETRICS & GYNECOLOGY
Payer: COMMERCIAL

## 2023-09-11 DIAGNOSIS — O09.522 AMA (ADVANCED MATERNAL AGE) MULTIGRAVIDA 35+, SECOND TRIMESTER: ICD-10-CM

## 2023-09-11 DIAGNOSIS — O48.0 POST-TERM PREGNANCY, 40-42 WEEKS OF GESTATION: Primary | ICD-10-CM

## 2023-09-11 DIAGNOSIS — Z98.891 S/P PRIMARY LOW TRANSVERSE C-SECTION: ICD-10-CM

## 2023-09-11 LAB
ABO + RH BLD: NORMAL
ALBUMIN SERPL BCP-MCNC: 3.2 G/DL (ref 3.5–5.2)
ALP SERPL-CCNC: 228 U/L (ref 55–135)
ALT SERPL W/O P-5'-P-CCNC: 12 U/L (ref 10–44)
ANION GAP SERPL CALC-SCNC: 13 MMOL/L (ref 8–16)
AST SERPL-CCNC: 18 U/L (ref 10–40)
BASOPHILS # BLD AUTO: 0.03 K/UL (ref 0–0.2)
BASOPHILS NFR BLD: 0.5 % (ref 0–1.9)
BILIRUB SERPL-MCNC: 0.7 MG/DL (ref 0.1–1)
BLD GP AB SCN CELLS X3 SERPL QL: NORMAL
BUN SERPL-MCNC: 6 MG/DL (ref 6–20)
CALCIUM SERPL-MCNC: 9 MG/DL (ref 8.7–10.5)
CHLORIDE SERPL-SCNC: 106 MMOL/L (ref 95–110)
CO2 SERPL-SCNC: 19 MMOL/L (ref 23–29)
CREAT SERPL-MCNC: 0.6 MG/DL (ref 0.5–1.4)
CREAT UR-MCNC: 37.4 MG/DL (ref 15–325)
DIFFERENTIAL METHOD: ABNORMAL
EOSINOPHIL # BLD AUTO: 0 K/UL (ref 0–0.5)
EOSINOPHIL NFR BLD: 0.5 % (ref 0–8)
ERYTHROCYTE [DISTWIDTH] IN BLOOD BY AUTOMATED COUNT: 15.8 % (ref 11.5–14.5)
EST. GFR  (NO RACE VARIABLE): >60 ML/MIN/1.73 M^2
GLUCOSE SERPL-MCNC: 80 MG/DL (ref 70–110)
HCT VFR BLD AUTO: 33.4 % (ref 37–48.5)
HGB BLD-MCNC: 11 G/DL (ref 12–16)
IMM GRANULOCYTES # BLD AUTO: 0.05 K/UL (ref 0–0.04)
IMM GRANULOCYTES NFR BLD AUTO: 0.9 % (ref 0–0.5)
LYMPHOCYTES # BLD AUTO: 1.2 K/UL (ref 1–4.8)
LYMPHOCYTES NFR BLD: 19.9 % (ref 18–48)
MCH RBC QN AUTO: 24.8 PG (ref 27–31)
MCHC RBC AUTO-ENTMCNC: 32.9 G/DL (ref 32–36)
MCV RBC AUTO: 75 FL (ref 82–98)
MONOCYTES # BLD AUTO: 0.5 K/UL (ref 0.3–1)
MONOCYTES NFR BLD: 9.1 % (ref 4–15)
NEUTROPHILS # BLD AUTO: 4 K/UL (ref 1.8–7.7)
NEUTROPHILS NFR BLD: 69.1 % (ref 38–73)
NRBC BLD-RTO: 0 /100 WBC
PLATELET # BLD AUTO: 314 K/UL (ref 150–450)
PMV BLD AUTO: 10.4 FL (ref 9.2–12.9)
POTASSIUM SERPL-SCNC: 3.6 MMOL/L (ref 3.5–5.1)
PROT SERPL-MCNC: 6.6 G/DL (ref 6–8.4)
PROT UR-MCNC: <7 MG/DL (ref 0–15)
PROT/CREAT UR: NORMAL MG/G{CREAT} (ref 0–0.2)
RBC # BLD AUTO: 4.43 M/UL (ref 4–5.4)
SODIUM SERPL-SCNC: 138 MMOL/L (ref 136–145)
WBC # BLD AUTO: 5.82 K/UL (ref 3.9–12.7)

## 2023-09-11 PROCEDURE — 85025 COMPLETE CBC W/AUTO DIFF WBC: CPT | Performed by: ADVANCED PRACTICE MIDWIFE

## 2023-09-11 PROCEDURE — 80053 COMPREHEN METABOLIC PANEL: CPT | Performed by: ADVANCED PRACTICE MIDWIFE

## 2023-09-11 PROCEDURE — 63600175 PHARM REV CODE 636 W HCPCS: Performed by: ADVANCED PRACTICE MIDWIFE

## 2023-09-11 PROCEDURE — 86850 RBC ANTIBODY SCREEN: CPT | Performed by: ADVANCED PRACTICE MIDWIFE

## 2023-09-11 PROCEDURE — 72100002 HC LABOR CARE, 1ST 8 HOURS

## 2023-09-11 PROCEDURE — 25000003 PHARM REV CODE 250: Performed by: ADVANCED PRACTICE MIDWIFE

## 2023-09-11 PROCEDURE — 82570 ASSAY OF URINE CREATININE: CPT | Performed by: MIDWIFE

## 2023-09-11 PROCEDURE — 11000001 HC ACUTE MED/SURG PRIVATE ROOM

## 2023-09-11 RX ORDER — OXYTOCIN/RINGER'S LACTATE 30/500 ML
334 PLASTIC BAG, INJECTION (ML) INTRAVENOUS ONCE
Status: COMPLETED | OUTPATIENT
Start: 2023-09-11 | End: 2023-09-13

## 2023-09-11 RX ORDER — DIPHENOXYLATE HYDROCHLORIDE AND ATROPINE SULFATE 2.5; .025 MG/1; MG/1
2 TABLET ORAL EVERY 6 HOURS PRN
Status: DISCONTINUED | OUTPATIENT
Start: 2023-09-11 | End: 2023-09-14

## 2023-09-11 RX ORDER — ONDANSETRON 8 MG/1
8 TABLET, ORALLY DISINTEGRATING ORAL EVERY 8 HOURS PRN
Status: DISCONTINUED | OUTPATIENT
Start: 2023-09-11 | End: 2023-09-14

## 2023-09-11 RX ORDER — PROCHLORPERAZINE EDISYLATE 5 MG/ML
5 INJECTION INTRAMUSCULAR; INTRAVENOUS EVERY 6 HOURS PRN
Status: DISCONTINUED | OUTPATIENT
Start: 2023-09-11 | End: 2023-09-14

## 2023-09-11 RX ORDER — TRANEXAMIC ACID 10 MG/ML
1000 INJECTION, SOLUTION INTRAVENOUS ONCE AS NEEDED
Status: DISCONTINUED | OUTPATIENT
Start: 2023-09-11 | End: 2023-09-14

## 2023-09-11 RX ORDER — SIMETHICONE 80 MG
1 TABLET,CHEWABLE ORAL 4 TIMES DAILY PRN
Status: DISCONTINUED | OUTPATIENT
Start: 2023-09-11 | End: 2023-09-14

## 2023-09-11 RX ORDER — METHYLERGONOVINE MALEATE 0.2 MG/ML
200 INJECTION INTRAVENOUS
Status: DISCONTINUED | OUTPATIENT
Start: 2023-09-11 | End: 2023-09-14

## 2023-09-11 RX ORDER — SODIUM CHLORIDE, SODIUM LACTATE, POTASSIUM CHLORIDE, CALCIUM CHLORIDE 600; 310; 30; 20 MG/100ML; MG/100ML; MG/100ML; MG/100ML
INJECTION, SOLUTION INTRAVENOUS CONTINUOUS
Status: DISCONTINUED | OUTPATIENT
Start: 2023-09-11 | End: 2023-09-13

## 2023-09-11 RX ORDER — OXYTOCIN/RINGER'S LACTATE 30/500 ML
334 PLASTIC BAG, INJECTION (ML) INTRAVENOUS ONCE AS NEEDED
Status: DISCONTINUED | OUTPATIENT
Start: 2023-09-11 | End: 2023-09-14

## 2023-09-11 RX ORDER — MISOPROSTOL 200 UG/1
800 TABLET ORAL ONCE AS NEEDED
Status: DISCONTINUED | OUTPATIENT
Start: 2023-09-11 | End: 2023-09-14

## 2023-09-11 RX ORDER — LIDOCAINE HYDROCHLORIDE 10 MG/ML
10 INJECTION INFILTRATION; PERINEURAL ONCE AS NEEDED
Status: DISCONTINUED | OUTPATIENT
Start: 2023-09-11 | End: 2023-09-14

## 2023-09-11 RX ORDER — CARBOPROST TROMETHAMINE 250 UG/ML
250 INJECTION, SOLUTION INTRAMUSCULAR
Status: DISCONTINUED | OUTPATIENT
Start: 2023-09-11 | End: 2023-09-14

## 2023-09-11 RX ORDER — TERBUTALINE SULFATE 1 MG/ML
0.25 INJECTION SUBCUTANEOUS
Status: DISCONTINUED | OUTPATIENT
Start: 2023-09-11 | End: 2023-09-14

## 2023-09-11 RX ORDER — OXYTOCIN/RINGER'S LACTATE 30/500 ML
95 PLASTIC BAG, INJECTION (ML) INTRAVENOUS ONCE AS NEEDED
Status: DISCONTINUED | OUTPATIENT
Start: 2023-09-11 | End: 2023-09-14

## 2023-09-11 RX ORDER — OXYTOCIN/RINGER'S LACTATE 30/500 ML
95 PLASTIC BAG, INJECTION (ML) INTRAVENOUS ONCE
Status: DISCONTINUED | OUTPATIENT
Start: 2023-09-11 | End: 2023-09-13

## 2023-09-11 RX ORDER — MUPIROCIN 20 MG/G
OINTMENT TOPICAL
Status: DISCONTINUED | OUTPATIENT
Start: 2023-09-11 | End: 2023-09-14

## 2023-09-11 RX ORDER — OXYTOCIN 10 [USP'U]/ML
10 INJECTION, SOLUTION INTRAMUSCULAR; INTRAVENOUS ONCE AS NEEDED
Status: DISCONTINUED | OUTPATIENT
Start: 2023-09-11 | End: 2023-09-14

## 2023-09-11 RX ORDER — CALCIUM CARBONATE 200(500)MG
500 TABLET,CHEWABLE ORAL 3 TIMES DAILY PRN
Status: DISCONTINUED | OUTPATIENT
Start: 2023-09-11 | End: 2023-09-14

## 2023-09-11 RX ADMIN — PENICILLIN G POTASSIUM 5 MILLION UNITS: 5000000 INJECTION, POWDER, FOR SOLUTION INTRAMUSCULAR; INTRAVENOUS at 10:09

## 2023-09-11 RX ADMIN — SODIUM CHLORIDE, POTASSIUM CHLORIDE, SODIUM LACTATE AND CALCIUM CHLORIDE: 600; 310; 30; 20 INJECTION, SOLUTION INTRAVENOUS at 10:09

## 2023-09-11 RX ADMIN — MISOPROSTOL 50 MCG: 100 TABLET ORAL at 09:09

## 2023-09-12 ENCOUNTER — ANESTHESIA EVENT (OUTPATIENT)
Dept: OBSTETRICS AND GYNECOLOGY | Facility: HOSPITAL | Age: 38
End: 2023-09-12
Payer: COMMERCIAL

## 2023-09-12 ENCOUNTER — ANESTHESIA (OUTPATIENT)
Dept: OBSTETRICS AND GYNECOLOGY | Facility: HOSPITAL | Age: 38
End: 2023-09-12
Payer: COMMERCIAL

## 2023-09-12 PROCEDURE — 63600175 PHARM REV CODE 636 W HCPCS: Performed by: ADVANCED PRACTICE MIDWIFE

## 2023-09-12 PROCEDURE — 27200710 HC EPIDURAL INFUSION PUMP SET: Performed by: ANESTHESIOLOGY

## 2023-09-12 PROCEDURE — 72100003 HC LABOR CARE, EA. ADDL. 8 HRS

## 2023-09-12 PROCEDURE — 59200 INSERT CERVICAL DILATOR: CPT

## 2023-09-12 PROCEDURE — 25000003 PHARM REV CODE 250: Performed by: ADVANCED PRACTICE MIDWIFE

## 2023-09-12 PROCEDURE — 27800516 HC TRAY, EPIDURAL COMBO: Performed by: ANESTHESIOLOGY

## 2023-09-12 PROCEDURE — 51702 INSERT TEMP BLADDER CATH: CPT

## 2023-09-12 PROCEDURE — 62326 NJX INTERLAMINAR LMBR/SAC: CPT | Performed by: NURSE ANESTHETIST, CERTIFIED REGISTERED

## 2023-09-12 PROCEDURE — 63600175 PHARM REV CODE 636 W HCPCS: Performed by: NURSE ANESTHETIST, CERTIFIED REGISTERED

## 2023-09-12 PROCEDURE — 25000003 PHARM REV CODE 250: Performed by: NURSE ANESTHETIST, CERTIFIED REGISTERED

## 2023-09-12 PROCEDURE — 11000001 HC ACUTE MED/SURG PRIVATE ROOM

## 2023-09-12 PROCEDURE — 63600175 PHARM REV CODE 636 W HCPCS: Performed by: MIDWIFE

## 2023-09-12 RX ORDER — ROPIVACAINE HYDROCHLORIDE 2 MG/ML
INJECTION, SOLUTION EPIDURAL; INFILTRATION
Status: DISCONTINUED | OUTPATIENT
Start: 2023-09-12 | End: 2023-09-13

## 2023-09-12 RX ORDER — LIDOCAINE HYDROCHLORIDE AND EPINEPHRINE 15; 5 MG/ML; UG/ML
INJECTION, SOLUTION EPIDURAL
Status: DISCONTINUED | OUTPATIENT
Start: 2023-09-12 | End: 2023-09-13

## 2023-09-12 RX ORDER — EPHEDRINE SULFATE 50 MG/ML
INJECTION, SOLUTION INTRAVENOUS
Status: DISCONTINUED | OUTPATIENT
Start: 2023-09-12 | End: 2023-09-13

## 2023-09-12 RX ORDER — MORPHINE SULFATE 10 MG/ML
5 INJECTION INTRAMUSCULAR; INTRAVENOUS; SUBCUTANEOUS ONCE
Status: COMPLETED | OUTPATIENT
Start: 2023-09-12 | End: 2023-09-12

## 2023-09-12 RX ORDER — HETASTARCH 6 G/100ML
INJECTION, SOLUTION INTRAVENOUS CONTINUOUS PRN
Status: DISCONTINUED | OUTPATIENT
Start: 2023-09-12 | End: 2023-09-13

## 2023-09-12 RX ORDER — OXYTOCIN/RINGER'S LACTATE 30/500 ML
0-30 PLASTIC BAG, INJECTION (ML) INTRAVENOUS CONTINUOUS
Status: DISCONTINUED | OUTPATIENT
Start: 2023-09-12 | End: 2023-09-13

## 2023-09-12 RX ADMIN — SODIUM CHLORIDE, POTASSIUM CHLORIDE, SODIUM LACTATE AND CALCIUM CHLORIDE: 600; 310; 30; 20 INJECTION, SOLUTION INTRAVENOUS at 08:09

## 2023-09-12 RX ADMIN — ROPIVACAINE HYDROCHLORIDE 12 ML/HR: 2 INJECTION, SOLUTION EPIDURAL; INFILTRATION at 04:09

## 2023-09-12 RX ADMIN — OXYTOCIN 2 MILLI-UNITS/MIN: 10 INJECTION, SOLUTION INTRAMUSCULAR; INTRAVENOUS at 08:09

## 2023-09-12 RX ADMIN — ROPIVACAINE HYDROCHLORIDE 12 ML/HR: 2 INJECTION, SOLUTION EPIDURAL; INFILTRATION at 09:09

## 2023-09-12 RX ADMIN — SODIUM CHLORIDE, POTASSIUM CHLORIDE, SODIUM LACTATE AND CALCIUM CHLORIDE: 600; 310; 30; 20 INJECTION, SOLUTION INTRAVENOUS at 09:09

## 2023-09-12 RX ADMIN — MISOPROSTOL 50 MCG: 100 TABLET ORAL at 02:09

## 2023-09-12 RX ADMIN — DEXTROSE MONOHYDRATE 3 MILLION UNITS: 50 INJECTION, SOLUTION INTRAVENOUS at 12:09

## 2023-09-12 RX ADMIN — DEXTROSE MONOHYDRATE 3 MILLION UNITS: 50 INJECTION, SOLUTION INTRAVENOUS at 05:09

## 2023-09-12 RX ADMIN — LIDOCAINE HYDROCHLORIDE,EPINEPHRINE BITARTRATE 3 ML: 15; .005 INJECTION, SOLUTION EPIDURAL; INFILTRATION; INTRACAUDAL; PERINEURAL at 09:09

## 2023-09-12 RX ADMIN — EPHEDRINE SULFATE 50 MG: 50 INJECTION INTRAVENOUS at 04:09

## 2023-09-12 RX ADMIN — DEXTROSE MONOHYDRATE 3 MILLION UNITS: 50 INJECTION, SOLUTION INTRAVENOUS at 02:09

## 2023-09-12 RX ADMIN — MORPHINE SULFATE 5 MG: 10 INJECTION, SOLUTION INTRAMUSCULAR; INTRAVENOUS at 02:09

## 2023-09-12 RX ADMIN — SODIUM CHLORIDE, POTASSIUM CHLORIDE, SODIUM LACTATE AND CALCIUM CHLORIDE: 600; 310; 30; 20 INJECTION, SOLUTION INTRAVENOUS at 02:09

## 2023-09-12 RX ADMIN — DEXTROSE MONOHYDRATE 3 MILLION UNITS: 50 INJECTION, SOLUTION INTRAVENOUS at 09:09

## 2023-09-12 RX ADMIN — ROPIVACAINE HYDROCHLORIDE 5 ML: 2 INJECTION, SOLUTION EPIDURAL; INFILTRATION at 09:09

## 2023-09-12 RX ADMIN — SODIUM CHLORIDE, POTASSIUM CHLORIDE, SODIUM LACTATE AND CALCIUM CHLORIDE: 600; 310; 30; 20 INJECTION, SOLUTION INTRAVENOUS at 07:09

## 2023-09-12 RX ADMIN — DEXTROSE MONOHYDRATE 3 MILLION UNITS: 50 INJECTION, SOLUTION INTRAVENOUS at 08:09

## 2023-09-12 RX ADMIN — HETASTARCH: 6 INJECTION, SOLUTION INTRAVENOUS at 04:09

## 2023-09-12 RX ADMIN — SODIUM CHLORIDE, SODIUM LACTATE, POTASSIUM CHLORIDE, CALCIUM CHLORIDE AND DEXTROSE MONOHYDRATE 500 ML: 5; 600; 310; 30; 20 INJECTION, SOLUTION INTRAVENOUS at 08:09

## 2023-09-12 RX ADMIN — OXYTOCIN 2 MILLI-UNITS/MIN: 10 INJECTION, SOLUTION INTRAMUSCULAR; INTRAVENOUS at 11:09

## 2023-09-12 RX ADMIN — SODIUM CHLORIDE, POTASSIUM CHLORIDE, SODIUM LACTATE AND CALCIUM CHLORIDE 1000 ML: 600; 310; 30; 20 INJECTION, SOLUTION INTRAVENOUS at 08:09

## 2023-09-12 NOTE — HOSPITAL COURSE
Cytotec IOL   : Doing ok this morning, rates pain 9/10 and would like an epidural but refusing a cervical exam. Discussed potential for cervix to still be unripe and discussed different pain medication options rather than epidural. Reviewed r/b/a, patient would like to move forward with epidural placement.     Patient ultimately underwent primary  for failure to progress without complication.  Transferred to mother baby unit for routine post partum care. Patient progressed well postoperatively without complication.  2023--stable for discharge pod #3

## 2023-09-12 NOTE — H&P
[facility] - Labor & Delivery  Obstetrics  History & Physical    Patient Name: Betty Felipe  MRN: [id number]  Admission Date: 2023  Primary Care Provider: Nay, Primary Doctor    Subjective:     Principal Problem:Post-term pregnancy, 40-42 weeks of gestation    History of Present Illness:  38 y.o.  CEDRICK [birth year] EGA 40w5d here for IOL for post dates pregnancy       Obstetric HPI:  Patient reports None contractions, active fetal movement, No vaginal bleeding , No loss of fluid     This pregnancy has been complicated by:  AMA, uterine fibroid, FREDY positive, fetal pyelectasis, GBS+    OB History    Para Term  AB Living   1 0 0 0 0 0   SAB IAB Ectopic Multiple Live Births   0 0 0 0 0      # Outcome Date GA Lbr Soham/2nd Weight Sex Delivery Anes PTL Lv   1 Current              No past medical history on file.  Past Surgical History:   Procedure Laterality Date    DENTAL SURGERY         PTA Medications   Medication Sig    aspirin (ECOTRIN) 81 MG EC tablet Take 81 mg by mouth.    ondansetron (ZOFRAN-ODT) 4 MG TbDL Take 4 mg by mouth every 8 (eight) hours as needed.    prenatal vit103-iron fum-folic () 27 mg iron- 1 mg Tab Take 1 tablet by mouth once daily.       Review of patient's allergies indicates:   Allergen Reactions    Hay fever and allergy relief Itching and Shortness Of Breath        Family History    None       Tobacco Use    Smoking status: Never    Smokeless tobacco: Never   Substance and Sexual Activity    Alcohol use: Not Currently    Drug use: Never    Sexual activity: Yes     Partners: Male     Birth control/protection: None     Review of Systems   Objective:     Vital Signs (Most Recent):    Vital Signs (24h Range):           There is no height or weight on file to calculate BMI.    FHT: 140 Cat 1 (reassuring)  TOCO:  none     Physical Exam:   Constitutional: She is oriented to person, place, and time. She appears well-developed and well-nourished.    HENT:   Head: Normocephalic.     Eyes: Conjunctivae are normal.      Pulmonary/Chest: Effort normal.        Abdominal: Soft.             Musculoskeletal: Normal range of motion.       Neurological: She is alert and oriented to person, place, and time.    Skin: Skin is warm and dry.    Psychiatric: She has a normal mood and affect. Her behavior is normal. Judgment and thought content normal.        Cervix:per RN  Dilation:  0  Effacement:  50%  Station: -3  Presentation: Vertex     Significant Labs:  Lab Results   Component Value Date    STREPBCULT (A) 2023     STREPTOCOCCUS AGALACTIAE (GROUP B)  In case of Penicillin allergy, call lab for further testing.  Beta-hemolytic streptococci are routinely susceptible to   penicillins,cephalosporins and carbapenems.         I have personallly reviewed all pertinent lab results from the last 24 hours.    Assessment/Plan:     38 y.o. female  at 40w5d for:    * Post-term pregnancy, 40-42 weeks of gestation  Cytotec IOL     GBS (group B Streptococcus carrier), +RV culture, currently pregnant  PCN prophylaxis     Pyelectasis of fetus on prenatal ultrasound  Notify peds following delivery         Farzana Lockett CNM  Obstetrics  [facility] - Labor & Delivery

## 2023-09-12 NOTE — SUBJECTIVE & OBJECTIVE
Interval History:  Betty is a 38 y.o.  at 40w6d. She is doing well.     Objective:     Vital Signs (Most Recent):  Temp: 99.3 °F (37.4 °C) (23)  Pulse: 85 (23 0605)  Resp: 17 (23 0210)  BP: 102/69 (23 0605) Vital Signs (24h Range):  Temp:  [99.3 °F (37.4 °C)] 99.3 °F (37.4 °C)  Pulse:  [85-90] 85  Resp:  [16-17] 17  BP: (102-115)/(66-69) 102/69     Weight: 73.7 kg (162 lb 7.7 oz)  Body mass index is 30.7 kg/m².    FHT: 145 Cat 1 (reassuring)  TOCO:  Q 2-6 minutes    Cervical Exam:  Deferred until after epidural placement     Significant Labs:  Lab Results   Component Value Date    GROUPTRH B POS 2023    STREPBCULT (A) 2023     STREPTOCOCCUS AGALACTIAE (GROUP B)  In case of Penicillin allergy, call lab for further testing.  Beta-hemolytic streptococci are routinely susceptible to   penicillins,cephalosporins and carbapenems.         I have personallly reviewed all pertinent lab results from the last 24 hours.    Physical Exam:   Constitutional: She is oriented to person, place, and time. She appears well-developed and well-nourished.    HENT:   Head: Normocephalic.      Cardiovascular:  Normal rate and regular rhythm.             Pulmonary/Chest: Effort normal.        Abdominal: Soft.     Genitourinary:    Vagina and uterus normal.             Musculoskeletal: Normal range of motion and moves all extremeties.       Neurological: She is alert and oriented to person, place, and time.    Skin: Skin is warm and dry.        Review of Systems

## 2023-09-12 NOTE — PROGRESS NOTES
[facility] - Labor & Delivery  Obstetrics  Labor Progress Note    Patient Name: Betty Felipe  MRN: [id number]  Admission Date: 2023  Hospital Length of Stay: 1 days  Attending Physician: Nga Diaz,*  Primary Care Provider: Nay, Primary Doctor    Subjective:     Principal Problem:Post-term pregnancy, 40-42 weeks of gestation    Hospital Course:  Cytotec IOL   : Doing ok this morning, rates pain 9/10 and would like an epidural but refusing a cervical exam. Discussed potential for cervix to still be unripe and discussed different pain medication options rather than epidural. Reviewed r/b/a, patient would like to move forward with epidural placement.       Interval History:  Betty is a 38 y.o.  at 40w6d. She is doing well.     Objective:     Vital Signs (Most Recent):  Temp: 99.3 °F (37.4 °C) (23)  Pulse: 85 (23 0605)  Resp: 17 (23)  BP: 102/69 (23 0605) Vital Signs (24h Range):  Temp:  [99.3 °F (37.4 °C)] 99.3 °F (37.4 °C)  Pulse:  [85-90] 85  Resp:  [16-17] 17  BP: (102-115)/(66-69) 102/69     Weight: 73.7 kg (162 lb 7.7 oz)  Body mass index is 30.7 kg/m².    FHT: 145 Cat 1 (reassuring)  TOCO:  Q 2-6 minutes    Cervical Exam:  Deferred until after epidural placement     Significant Labs:  Lab Results   Component Value Date    GROUPTRH B POS 2023    STREPBCULT (A) 2023     STREPTOCOCCUS AGALACTIAE (GROUP B)  In case of Penicillin allergy, call lab for further testing.  Beta-hemolytic streptococci are routinely susceptible to   penicillins,cephalosporins and carbapenems.         I have personallly reviewed all pertinent lab results from the last 24 hours.    Physical Exam:   Constitutional: She is oriented to person, place, and time. She appears well-developed and well-nourished.    HENT:   Head: Normocephalic.      Cardiovascular:  Normal rate and regular rhythm.             Pulmonary/Chest: Effort normal.        Abdominal: Soft.     Genitourinary:     Vagina and uterus normal.             Musculoskeletal: Normal range of motion and moves all extremeties.       Neurological: She is alert and oriented to person, place, and time.    Skin: Skin is warm and dry.        Review of Systems    Assessment/Plan:     38 y.o. female  at 40w6d for:    * Post-term pregnancy, 40-42 weeks of gestation  Cytotec IOL     GBS (group B Streptococcus carrier), +RV culture, currently pregnant  PCN prophylaxis     Pyelectasis of fetus on prenatal ultrasound  Notify peds following delivery           Maddy Carter CNM  Obstetrics  [facility] - [facility] Delivery

## 2023-09-12 NOTE — ANESTHESIA PREPROCEDURE EVALUATION
2023  Betty Felipe is a 38 y.o., female.  AMA,  CEDRICK 23 EGA 40w5d     Pre-op Assessment    I have reviewed the Patient Summary Reports.     I have reviewed the Nursing Notes.    I have reviewed the Medications.     Review of Systems  Anesthesia Hx:  No previous Anesthesia    Social:  Non-Smoker    Hematology/Oncology:  Hematology Normal   Oncology Normal     EENT/Dental:EENT/Dental Normal   Cardiovascular:   Exercise tolerance: good NYHA Classification I    Pulmonary:  Pulmonary Normal    Renal/:  Renal/ Normal     Hepatic/GI:  Hepatic/GI Normal    Musculoskeletal:  Musculoskeletal Normal    Neurological:  Neurology Normal    Endocrine:  Endocrine Normal    Dermatological:  Skin Normal    Psych:  Psychiatric Normal           Physical Exam  General: Well nourished and Cooperative    Airway:  Mallampati: II   Mouth Opening: Normal  Tongue: Normal  Neck ROM: Normal ROM    Dental:  Intact    Chest/Lungs:  Clear to auscultation, Normal Respiratory Rate    Heart:  Rate: Normal  Rhythm: Regular Rhythm        Anesthesia Plan  Type of Anesthesia, risks & benefits discussed:    Anesthesia Type: Epidural  Intra-op Monitoring Plan: Standard ASA Monitors  Post Op Pain Control Plan: multimodal analgesia  Informed Consent: Informed consent signed with the Patient and all parties understand the risks and agree with anesthesia plan.  All questions answered. Patient consented to blood products? Yes  ASA Score: 2  Day of Surgery Review of History & Physical: I have interviewed and examined the patient. I have reviewed the patient's H&P dated: There are no significant changes.     Ready For Surgery From Anesthesia Perspective.     .

## 2023-09-12 NOTE — SUBJECTIVE & OBJECTIVE
Obstetric HPI:  Patient reports None contractions, active fetal movement, No vaginal bleeding , No loss of fluid     This pregnancy has been complicated by:  AMA, uterine fibroid, FREDY positive, fetal pyelectasis, GBS+    OB History    Para Term  AB Living   1 0 0 0 0 0   SAB IAB Ectopic Multiple Live Births   0 0 0 0 0      # Outcome Date GA Lbr Soham/2nd Weight Sex Delivery Anes PTL Lv   1 Current              No past medical history on file.  Past Surgical History:   Procedure Laterality Date    DENTAL SURGERY         PTA Medications   Medication Sig    aspirin (ECOTRIN) 81 MG EC tablet Take 81 mg by mouth.    ondansetron (ZOFRAN-ODT) 4 MG TbDL Take 4 mg by mouth every 8 (eight) hours as needed.    prenatal vit103-iron fum-folic () 27 mg iron- 1 mg Tab Take 1 tablet by mouth once daily.       Review of patient's allergies indicates:   Allergen Reactions    Hay fever and allergy relief Itching and Shortness Of Breath        Family History    None       Tobacco Use    Smoking status: Never    Smokeless tobacco: Never   Substance and Sexual Activity    Alcohol use: Not Currently    Drug use: Never    Sexual activity: Yes     Partners: Male     Birth control/protection: None     Review of Systems   Objective:     Vital Signs (Most Recent):    Vital Signs (24h Range):           There is no height or weight on file to calculate BMI.    FHT: 140 Cat 1 (reassuring)  TOCO:  none     Physical Exam:   Constitutional: She is oriented to person, place, and time. She appears well-developed and well-nourished.    HENT:   Head: Normocephalic.    Eyes: Conjunctivae are normal.      Pulmonary/Chest: Effort normal.        Abdominal: Soft.             Musculoskeletal: Normal range of motion.       Neurological: She is alert and oriented to person, place, and time.    Skin: Skin is warm and dry.    Psychiatric: She has a normal mood and affect. Her behavior is normal. Judgment and thought content normal.         Cervix:per RN  Dilation:  0  Effacement:  50%  Station: -3  Presentation: Vertex     Significant Labs:  Lab Results   Component Value Date    STREPBCULT (A) 08/17/2023     STREPTOCOCCUS AGALACTIAE (GROUP B)  In case of Penicillin allergy, call lab for further testing.  Beta-hemolytic streptococci are routinely susceptible to   penicillins,cephalosporins and carbapenems.         I have personallly reviewed all pertinent lab results from the last 24 hours.

## 2023-09-12 NOTE — PROGRESS NOTES
S: Resting comfortable with ADEOLA in place.   O:  VS reviewed, afebrile   Vitals:    23 0928 23 0930 23 0933 23 0934   BP: 122/63 114/67 113/63    Pulse: 87 85 87    Resp:       Temp:    99 °F (37.2 °C)   TempSrc:    Oral   SpO2:  100%     Weight:       Height:           FHTs 145 cat 1, reassuring  UC 2-8  SVE /-3  CRB placed with 80cc/80cc in balloon. Tolerated well.     A: IUP @ 40w6d ;     Patient Active Problem List   Diagnosis    AMA (advanced maternal age) multigravida 35+, second trimester    Uterine fibroid during pregnancy, antepartum    FREDY positive    Pyelectasis of fetus on prenatal ultrasound    GBS (group B Streptococcus carrier), +RV culture, currently pregnant    Post-term pregnancy, 40-42 weeks of gestation       P:   Continue close monitoring of maternal/fetal status  Start pitocin per protocol. Anticipate progress and

## 2023-09-12 NOTE — ASSESSMENT & PLAN NOTE
Cytotec IOL    bleed K92.2       Isolation/Infection:   Isolation          Contact        Patient Infection Status     Infection Onset Added Last Indicated Last Indicated By Review Planned Expiration Resolved Resolved By    MRSA 09/09/21 09/11/21 09/10/21 Culture, MRSA, Screening        Resolved    COVID-19 Rule Out 09/30/21 09/30/21 09/30/21 COVID-19, Rapid (Ordered)   09/30/21 Rule-Out Test Resulted    COVID-19 Rule Out 09/08/21 09/08/21 09/08/21 COVID-19, Rapid (Ordered)   09/08/21 Rule-Out Test Resulted          Nurse Assessment:  Last Vital Signs: BP (!) 127/48   Pulse 78   Temp 98.8 °F (37.1 °C) (Oral)   Resp 18   Ht 5' 1\" (1.549 m)   Wt 211 lb (95.7 kg)   SpO2 93%   BMI 39.87 kg/m²     Last documented pain score (0-10 scale): Pain Level: 10  Last Weight:   Wt Readings from Last 1 Encounters:   10/02/21 211 lb (95.7 kg)     Mental Status:  oriented and alert    IV Access:  - None    Nursing Mobility/ADLs:  Walking   Dependent  Transfer  Dependent  Bathing  Dependent  Dressing  Dependent  Toileting  Dependent  Feeding  Assisted  Med Admin  Assisted  Med Delivery   none    Wound Care Documentation and Therapy:  Pressure Ulcer 09/02/16 Buttocks Right Two small, circular (Active)   Number of days: 1857        Elimination:  Continence:   · Bowel: No  · Bladder: No  Urinary Catheter: None   Colostomy/Ileostomy/Ileal Conduit: No       Date of Last BM: 10/1      Intake/Output Summary (Last 24 hours) at 10/4/2021 1304  Last data filed at 10/3/2021 2036  Gross per 24 hour   Intake --   Output 1000 ml   Net -1000 ml     I/O last 3 completed shifts:   In: 10 [I.V.:10]  Out: 1000 [Urine:1000]    Safety Concerns:     None    Impairments/Disabilities:      None      Patient's personal belongings (please select all that are sent with patient):  Sally    RN SIGNATURE:  Electronically signed by Nilo Fernandez RN on 10/4/21 at 4:29 PM EDT      PHYSICIAN SECTION    Prognosis: Guarded    Condition at Discharge: Stable    Rehab Potential (if transferring to Rehab): Guarded    Recommended Labs or Other Treatments After Discharge: CBC IN 1 WEEK    Physician Certification: I certify the above information and transfer of Manjula Felder  is necessary for the continuing treatment of the diagnosis listed and that she requires Intermediate Nursing Care for greater 30 days. Update Admission H&P: No change in H&P     Nutrition Therapy:  Current Nutrition Therapy:   - Oral Diet:  Dysphagia 3 advanced    Routes of Feeding: Oral  Liquids: No Restrictions  Daily Fluid Restriction: no  Last Modified Barium Swallow with Video (Video Swallowing Test): not done    Treatments at the Time of Hospital Discharge:   Respiratory Treatments: 5L Oxygen  Oxygen Therapy:  is on oxygen at 5 L/min per nasal cannula.   Ventilator:    - No ventilator support    Rehab Therapies: Physical Therapy and Occupational Therapy, 2 hoursly turns  Weight Bearing Status/Restrictions: No weight bearing restirctions  Other Medical Equipment (for information only, NOT a DME order):  hospital bed  Other Treatments: Protonix bid for life      PHYSICIAN SIGNATURE:  Electronically signed by Gregorio Cronin MD on 10/4/21 at 10:02 AM EDT

## 2023-09-12 NOTE — ANESTHESIA PROCEDURE NOTES
Epidural    Patient location during procedure: OB   Reason for block: primary anesthetic   Reason for block: labor analgesia requested by patient and obstetrician  Diagnosis: IUP   Start time: 9/12/2023 9:11 AM  Timeout: 9/12/2023 9:10 AM  End time: 9/12/2023 9:26 AM  Surgery related to: Planned vaginal delivery    Staffing  Performing Provider: Jose Dorman Jr. CRNA  Authorizing Provider: Jose Garcia II, MD    Staffing  Performed by: Jose Dorman Jr. CRNA  Authorized by: Jose Garcia II, MD        Preanesthetic Checklist  Completed: patient identified, IV checked, site marked, risks and benefits discussed, surgical consent, monitors and equipment checked, pre-op evaluation, timeout performed, anesthesia consent given, hand hygiene performed and patient being monitored  Preparation  Patient position: sitting  Prep: ChloraPrep  Patient monitoring: Pulse Ox and Blood Pressure  Reason for block: primary anesthetic   Epidural  Skin Anesthetic: lidocaine 1%  Skin Wheal: 3 mL  Administration type: continuous  Approach: midline  Interspace: L3-4    Injection technique: LEÓN air  Needle and Epidural Catheter  Needle type: Tuohy   Needle gauge: 17  Needle length: 3.5 inches  Needle insertion depth: 7 cm  Catheter type: springwound and multi-orifice  Catheter size: 19 G  Catheter at skin depth: 15 cm  Insertion Attempts: 1  Test dose: 3 mL of lidocaine 1.5% with Epi 1-to-200,000  Additional Documentation: incremental injection, negative aspiration for heme and CSF, no paresthesia on injection, no signs/symptoms of IV or SA injection, no significant pain on injection and no significant complaints from patient  Needle localization: anatomical landmarks  Medications:  Volume per aspiration: 0 mL  Time between aspirations: 2 minutes   Assessment  Upper dermatomal levels - Left: T10  Right: T10   Dermatomal levels determined by pinch or prick  Ease of block: easy  Patient's tolerance of the procedure:  comfortable throughout block and no complaints No inadvertent dural puncture with Tuohy.  Dural puncture not performed with spinal needle

## 2023-09-12 NOTE — PROGRESS NOTES
S: Resting comfortable with ADEOLA in place  O:  VS reviewed, afebrile   Vitals:    09/12/23 1535 09/12/23 1550 09/12/23 1605 09/12/23 1620   BP: (!) 99/54 (!) 100/52 (!) 99/58 (!) 119/59   Pulse: 79 78 92 83   Resp:       Temp: 99.5 °F (37.5 °C)      TempSrc: Oral      SpO2: 96% 96% 96% 99%   Weight:       Height:           FHTs 1555 cat 1, reassuring  UC 2-4  SVE 4-5/60/-3  AROM of clear fluid    A: IUP @ 40w6d ;     Patient Active Problem List   Diagnosis    AMA (advanced maternal age) multigravida 35+, second trimester    Uterine fibroid during pregnancy, antepartum    FREDY positive    Pyelectasis of fetus on prenatal ultrasound    GBS (group B Streptococcus carrier), +RV culture, currently pregnant    Post-term pregnancy, 40-42 weeks of gestation       P:   Continue close monitoring of maternal/fetal status  Continue with pitocin IOL.

## 2023-09-13 PROBLEM — Z98.891 S/P PRIMARY LOW TRANSVERSE C-SECTION: Status: ACTIVE | Noted: 2023-09-13

## 2023-09-13 PROCEDURE — 25000003 PHARM REV CODE 250: Performed by: NURSE ANESTHETIST, CERTIFIED REGISTERED

## 2023-09-13 PROCEDURE — 25000003 PHARM REV CODE 250: Performed by: ADVANCED PRACTICE MIDWIFE

## 2023-09-13 PROCEDURE — 25000003 PHARM REV CODE 250: Performed by: OBSTETRICS & GYNECOLOGY

## 2023-09-13 PROCEDURE — 63600175 PHARM REV CODE 636 W HCPCS: Performed by: NURSE ANESTHETIST, CERTIFIED REGISTERED

## 2023-09-13 PROCEDURE — 11000001 HC ACUTE MED/SURG PRIVATE ROOM

## 2023-09-13 PROCEDURE — 37000009 HC ANESTHESIA EA ADD 15 MINS: Performed by: OBSTETRICS & GYNECOLOGY

## 2023-09-13 PROCEDURE — 59510 CESAREAN DELIVERY: CPT | Mod: GB,,, | Performed by: OBSTETRICS & GYNECOLOGY

## 2023-09-13 PROCEDURE — 37000008 HC ANESTHESIA 1ST 15 MINUTES: Performed by: OBSTETRICS & GYNECOLOGY

## 2023-09-13 PROCEDURE — 71000039 HC RECOVERY, EACH ADD'L HOUR: Performed by: OBSTETRICS & GYNECOLOGY

## 2023-09-13 PROCEDURE — 71000033 HC RECOVERY, INTIAL HOUR: Performed by: OBSTETRICS & GYNECOLOGY

## 2023-09-13 PROCEDURE — 36004724 HC OB OR TIME LEV III - 1ST 15 MIN: Performed by: OBSTETRICS & GYNECOLOGY

## 2023-09-13 PROCEDURE — 63600175 PHARM REV CODE 636 W HCPCS: Performed by: ADVANCED PRACTICE MIDWIFE

## 2023-09-13 PROCEDURE — 59510 PR FULL ROUT OBSTE CARE,CESAREAN DELIV: ICD-10-PCS | Mod: GB,,, | Performed by: OBSTETRICS & GYNECOLOGY

## 2023-09-13 PROCEDURE — 36004725 HC OB OR TIME LEV III - EA ADD 15 MIN: Performed by: OBSTETRICS & GYNECOLOGY

## 2023-09-13 RX ORDER — IBUPROFEN 800 MG/1
800 TABLET ORAL EVERY 8 HOURS
Status: DISCONTINUED | OUTPATIENT
Start: 2023-09-14 | End: 2023-09-13

## 2023-09-13 RX ORDER — ACETAMINOPHEN 325 MG/1
650 TABLET ORAL EVERY 6 HOURS PRN
Status: DISCONTINUED | OUTPATIENT
Start: 2023-09-13 | End: 2023-09-16 | Stop reason: HOSPADM

## 2023-09-13 RX ORDER — TRANEXAMIC ACID 10 MG/ML
1000 INJECTION, SOLUTION INTRAVENOUS ONCE AS NEEDED
Status: DISCONTINUED | OUTPATIENT
Start: 2023-09-13 | End: 2023-09-16 | Stop reason: HOSPADM

## 2023-09-13 RX ORDER — BUPIVACAINE HYDROCHLORIDE 2.5 MG/ML
INJECTION, SOLUTION INFILTRATION; PERINEURAL
Status: DISCONTINUED | OUTPATIENT
Start: 2023-09-13 | End: 2023-09-13

## 2023-09-13 RX ORDER — MORPHINE SULFATE 1 MG/ML
INJECTION, SOLUTION EPIDURAL; INTRATHECAL; INTRAVENOUS
Status: DISCONTINUED | OUTPATIENT
Start: 2023-09-13 | End: 2023-09-13

## 2023-09-13 RX ORDER — IBUPROFEN 800 MG/1
800 TABLET ORAL EVERY 8 HOURS
Status: DISCONTINUED | OUTPATIENT
Start: 2023-09-13 | End: 2023-09-16 | Stop reason: HOSPADM

## 2023-09-13 RX ORDER — SODIUM CITRATE AND CITRIC ACID MONOHYDRATE 334; 500 MG/5ML; MG/5ML
30 SOLUTION ORAL
Status: DISCONTINUED | OUTPATIENT
Start: 2023-09-13 | End: 2023-09-16 | Stop reason: HOSPADM

## 2023-09-13 RX ORDER — AMOXICILLIN 250 MG
1 CAPSULE ORAL NIGHTLY PRN
Status: DISCONTINUED | OUTPATIENT
Start: 2023-09-13 | End: 2023-09-16 | Stop reason: HOSPADM

## 2023-09-13 RX ORDER — BISACODYL 10 MG
10 SUPPOSITORY, RECTAL RECTAL ONCE AS NEEDED
Status: DISCONTINUED | OUTPATIENT
Start: 2023-09-13 | End: 2023-09-16 | Stop reason: HOSPADM

## 2023-09-13 RX ORDER — OXYTOCIN/RINGER'S LACTATE 30/500 ML
95 PLASTIC BAG, INJECTION (ML) INTRAVENOUS ONCE
Status: DISCONTINUED | OUTPATIENT
Start: 2023-09-13 | End: 2023-09-13

## 2023-09-13 RX ORDER — HYDROCODONE BITARTRATE AND ACETAMINOPHEN 10; 325 MG/1; MG/1
1 TABLET ORAL EVERY 4 HOURS PRN
Status: DISCONTINUED | OUTPATIENT
Start: 2023-09-13 | End: 2023-09-16 | Stop reason: HOSPADM

## 2023-09-13 RX ORDER — CARBOPROST TROMETHAMINE 250 UG/ML
250 INJECTION, SOLUTION INTRAMUSCULAR
Status: DISCONTINUED | OUTPATIENT
Start: 2023-09-13 | End: 2023-09-16 | Stop reason: HOSPADM

## 2023-09-13 RX ORDER — DEXAMETHASONE SODIUM PHOSPHATE 4 MG/ML
INJECTION, SOLUTION INTRA-ARTICULAR; INTRALESIONAL; INTRAMUSCULAR; INTRAVENOUS; SOFT TISSUE
Status: DISCONTINUED | OUTPATIENT
Start: 2023-09-13 | End: 2023-09-13

## 2023-09-13 RX ORDER — OXYTOCIN 10 [USP'U]/ML
10 INJECTION, SOLUTION INTRAMUSCULAR; INTRAVENOUS ONCE AS NEEDED
Status: DISCONTINUED | OUTPATIENT
Start: 2023-09-13 | End: 2023-09-16 | Stop reason: HOSPADM

## 2023-09-13 RX ORDER — DIPHENHYDRAMINE HYDROCHLORIDE 50 MG/ML
12.5 INJECTION INTRAMUSCULAR; INTRAVENOUS
Status: DISCONTINUED | OUTPATIENT
Start: 2023-09-13 | End: 2023-09-16 | Stop reason: HOSPADM

## 2023-09-13 RX ORDER — METHYLERGONOVINE MALEATE 0.2 MG/ML
200 INJECTION INTRAVENOUS
Status: DISCONTINUED | OUTPATIENT
Start: 2023-09-13 | End: 2023-09-16 | Stop reason: HOSPADM

## 2023-09-13 RX ORDER — ONDANSETRON 8 MG/1
8 TABLET, ORALLY DISINTEGRATING ORAL EVERY 8 HOURS PRN
Status: DISCONTINUED | OUTPATIENT
Start: 2023-09-13 | End: 2023-09-16 | Stop reason: HOSPADM

## 2023-09-13 RX ORDER — MISOPROSTOL 200 UG/1
800 TABLET ORAL ONCE AS NEEDED
Status: DISCONTINUED | OUTPATIENT
Start: 2023-09-13 | End: 2023-09-16 | Stop reason: HOSPADM

## 2023-09-13 RX ORDER — DIPHENOXYLATE HYDROCHLORIDE AND ATROPINE SULFATE 2.5; .025 MG/1; MG/1
2 TABLET ORAL EVERY 6 HOURS PRN
Status: DISCONTINUED | OUTPATIENT
Start: 2023-09-13 | End: 2023-09-16 | Stop reason: HOSPADM

## 2023-09-13 RX ORDER — FAMOTIDINE 10 MG/ML
20 INJECTION INTRAVENOUS
Status: DISCONTINUED | OUTPATIENT
Start: 2023-09-13 | End: 2023-09-16 | Stop reason: HOSPADM

## 2023-09-13 RX ORDER — SODIUM CHLORIDE, SODIUM LACTATE, POTASSIUM CHLORIDE, CALCIUM CHLORIDE 600; 310; 30; 20 MG/100ML; MG/100ML; MG/100ML; MG/100ML
INJECTION, SOLUTION INTRAVENOUS CONTINUOUS
Status: DISCONTINUED | OUTPATIENT
Start: 2023-09-13 | End: 2023-09-13

## 2023-09-13 RX ORDER — ADHESIVE BANDAGE
30 BANDAGE TOPICAL EVERY 6 HOURS PRN
Status: DISCONTINUED | OUTPATIENT
Start: 2023-09-14 | End: 2023-09-16 | Stop reason: HOSPADM

## 2023-09-13 RX ORDER — KETOROLAC TROMETHAMINE 30 MG/ML
30 INJECTION, SOLUTION INTRAMUSCULAR; INTRAVENOUS EVERY 8 HOURS
Status: DISCONTINUED | OUTPATIENT
Start: 2023-09-13 | End: 2023-09-13

## 2023-09-13 RX ORDER — CEFAZOLIN SODIUM 1 G/3ML
INJECTION, POWDER, FOR SOLUTION INTRAMUSCULAR; INTRAVENOUS
Status: DISCONTINUED | OUTPATIENT
Start: 2023-09-13 | End: 2023-09-13

## 2023-09-13 RX ORDER — NALOXONE HCL 0.4 MG/ML
0.04 VIAL (ML) INJECTION EVERY 5 MIN PRN
Status: DISCONTINUED | OUTPATIENT
Start: 2023-09-13 | End: 2023-09-16 | Stop reason: HOSPADM

## 2023-09-13 RX ORDER — MISOPROSTOL 200 UG/1
800 TABLET ORAL
Status: DISCONTINUED | OUTPATIENT
Start: 2023-09-13 | End: 2023-09-16 | Stop reason: HOSPADM

## 2023-09-13 RX ORDER — KETOROLAC TROMETHAMINE 30 MG/ML
INJECTION, SOLUTION INTRAMUSCULAR; INTRAVENOUS
Status: DISCONTINUED | OUTPATIENT
Start: 2023-09-13 | End: 2023-09-13

## 2023-09-13 RX ORDER — CEFAZOLIN SODIUM 2 G/50ML
2 SOLUTION INTRAVENOUS ONCE AS NEEDED
Status: DISCONTINUED | OUTPATIENT
Start: 2023-09-13 | End: 2023-09-16 | Stop reason: HOSPADM

## 2023-09-13 RX ORDER — OXYTOCIN/RINGER'S LACTATE 30/500 ML
334 PLASTIC BAG, INJECTION (ML) INTRAVENOUS ONCE
Status: DISCONTINUED | OUTPATIENT
Start: 2023-09-13 | End: 2023-09-13

## 2023-09-13 RX ORDER — OXYTOCIN/RINGER'S LACTATE 30/500 ML
334 PLASTIC BAG, INJECTION (ML) INTRAVENOUS ONCE AS NEEDED
Status: DISCONTINUED | OUTPATIENT
Start: 2023-09-13 | End: 2023-09-16 | Stop reason: HOSPADM

## 2023-09-13 RX ORDER — DIPHENHYDRAMINE HYDROCHLORIDE 50 MG/ML
25 INJECTION INTRAMUSCULAR; INTRAVENOUS EVERY 4 HOURS PRN
Status: DISCONTINUED | OUTPATIENT
Start: 2023-09-13 | End: 2023-09-16 | Stop reason: HOSPADM

## 2023-09-13 RX ORDER — DIPHENHYDRAMINE HCL 25 MG
25 CAPSULE ORAL EVERY 4 HOURS PRN
Status: DISCONTINUED | OUTPATIENT
Start: 2023-09-13 | End: 2023-09-16 | Stop reason: HOSPADM

## 2023-09-13 RX ORDER — SODIUM CHLORIDE 0.9 % (FLUSH) 0.9 %
10 SYRINGE (ML) INJECTION
Status: DISCONTINUED | OUTPATIENT
Start: 2023-09-13 | End: 2023-09-16 | Stop reason: HOSPADM

## 2023-09-13 RX ORDER — HYDROCODONE BITARTRATE AND ACETAMINOPHEN 5; 325 MG/1; MG/1
1 TABLET ORAL EVERY 4 HOURS PRN
Status: DISCONTINUED | OUTPATIENT
Start: 2023-09-13 | End: 2023-09-16 | Stop reason: HOSPADM

## 2023-09-13 RX ORDER — LIDOCAINE HYDROCHLORIDE 20 MG/ML
INJECTION, SOLUTION EPIDURAL; INFILTRATION; INTRACAUDAL; PERINEURAL
Status: DISCONTINUED | OUTPATIENT
Start: 2023-09-13 | End: 2023-09-13

## 2023-09-13 RX ORDER — OXYTOCIN/RINGER'S LACTATE 30/500 ML
95 PLASTIC BAG, INJECTION (ML) INTRAVENOUS ONCE AS NEEDED
Status: DISCONTINUED | OUTPATIENT
Start: 2023-09-13 | End: 2023-09-16 | Stop reason: HOSPADM

## 2023-09-13 RX ORDER — ONDANSETRON 2 MG/ML
INJECTION INTRAMUSCULAR; INTRAVENOUS
Status: DISCONTINUED | OUTPATIENT
Start: 2023-09-13 | End: 2023-09-13

## 2023-09-13 RX ORDER — PROCHLORPERAZINE EDISYLATE 5 MG/ML
5 INJECTION INTRAMUSCULAR; INTRAVENOUS EVERY 6 HOURS PRN
Status: DISCONTINUED | OUTPATIENT
Start: 2023-09-13 | End: 2023-09-16 | Stop reason: HOSPADM

## 2023-09-13 RX ORDER — SIMETHICONE 80 MG
1 TABLET,CHEWABLE ORAL EVERY 6 HOURS PRN
Status: DISCONTINUED | OUTPATIENT
Start: 2023-09-13 | End: 2023-09-16 | Stop reason: HOSPADM

## 2023-09-13 RX ADMIN — Medication 334 ML/HR: at 02:09

## 2023-09-13 RX ADMIN — MORPHINE SULFATE 2 MG: 1 INJECTION, SOLUTION EPIDURAL; INTRATHECAL; INTRAVENOUS at 02:09

## 2023-09-13 RX ADMIN — ONDANSETRON 4 MG: 2 INJECTION INTRAMUSCULAR; INTRAVENOUS at 02:09

## 2023-09-13 RX ADMIN — AZITHROMYCIN 500 MG: 500 INJECTION, POWDER, LYOPHILIZED, FOR SOLUTION INTRAVENOUS at 02:09

## 2023-09-13 RX ADMIN — HYDROCODONE BITARTRATE AND ACETAMINOPHEN 1 TABLET: 10; 325 TABLET ORAL at 09:09

## 2023-09-13 RX ADMIN — DEXAMETHASONE SODIUM PHOSPHATE 4 MG: 4 INJECTION, SOLUTION INTRA-ARTICULAR; INTRALESIONAL; INTRAMUSCULAR; INTRAVENOUS; SOFT TISSUE at 02:09

## 2023-09-13 RX ADMIN — DEXTROSE MONOHYDRATE 3 MILLION UNITS: 50 INJECTION, SOLUTION INTRAVENOUS at 01:09

## 2023-09-13 RX ADMIN — CEFAZOLIN 2 G: 330 INJECTION, POWDER, FOR SOLUTION INTRAMUSCULAR; INTRAVENOUS at 02:09

## 2023-09-13 RX ADMIN — IBUPROFEN 800 MG: 800 TABLET, FILM COATED ORAL at 10:09

## 2023-09-13 RX ADMIN — HYDROCODONE BITARTRATE AND ACETAMINOPHEN 1 TABLET: 10; 325 TABLET ORAL at 04:09

## 2023-09-13 RX ADMIN — IBUPROFEN 800 MG: 800 TABLET, FILM COATED ORAL at 11:09

## 2023-09-13 RX ADMIN — BUPIVACAINE HYDROCHLORIDE 5 ML: 2.5 INJECTION, SOLUTION INFILTRATION; PERINEURAL at 01:09

## 2023-09-13 RX ADMIN — HYDROCODONE BITARTRATE AND ACETAMINOPHEN 1 TABLET: 10; 325 TABLET ORAL at 05:09

## 2023-09-13 RX ADMIN — LIDOCAINE HYDROCHLORIDE 10 ML: 20 INJECTION, SOLUTION EPIDURAL; INFILTRATION; INTRACAUDAL; PERINEURAL at 02:09

## 2023-09-13 RX ADMIN — KETOROLAC TROMETHAMINE 30 MG: 30 INJECTION, SOLUTION INTRAMUSCULAR; INTRAVENOUS at 02:09

## 2023-09-13 NOTE — PROGRESS NOTES
S: Resting comfortable with ADEOLA in place.   O:  VS reviewed, afebrile   Vitals:    23 2315 23 2330 23 2345 23 0000   BP: (!) 104/59 (!) 105/59 115/67 122/69   Pulse: 72 66 64 68   Resp:       Temp:       TempSrc:       SpO2: 100% 100% 100% 100%   Weight:       Height:           FHTs 145 with subtle late declerations, good BTB variability   UC 1-5  SVE 6/80/-2    A: IUP @ 41w0d ;     Patient Active Problem List   Diagnosis    AMA (advanced maternal age) multigravida 35+, second trimester    Uterine fibroid during pregnancy, antepartum    FREDY positive    Pyelectasis of fetus on prenatal ultrasound    GBS (group B Streptococcus carrier), +RV culture, currently pregnant    Post-term pregnancy, 40-42 weeks of gestation       P:   Continue close monitoring of maternal/fetal status. Discussed POC with Dr. Madera. Has had good cervical change since last exam. Will continue to monitor closely. Lengthy discussion with patient and her family about need for frequent position changes and need for more cervical change soon. Patient aware of FHTs and possible need for  later if heart tones worsen and there is no progress.

## 2023-09-13 NOTE — PROGRESS NOTES
09/12/23 8640   [facility] Note - Strip   Strip Reviewed by Estrell[facility] Nurse? Yes   [facility] Note - Communication   [place] Nurse Communicated with Bedside Nurse Regarding: Fetal Status;Other (See Note)  (Status check)   Tele[facility] Note - Notification   Nurse Notified? Yes  (at BS repositioning pt.)   Name of Nurse Spoke with Robyn

## 2023-09-13 NOTE — PROGRESS NOTES
09/12/23 2211   [facility] Note - Strip   Strip Reviewed by Estrellita Nurse? Yes   [facility] Note - Communication   [place] Nurse Communicated with Bedside Nurse Regarding: Fetal Status;Other (See Note)  (Status check)   Tele[facility] Note - Notification   Nurse Notified? Yes  (MARY and MD monitoring strip and at BS.)   Name of Nurse Eusebia

## 2023-09-13 NOTE — LACTATION NOTE
"Lactation Rounds:    Upon arrival mother request "can you come back tomorrow?" Mother reports she has a lactation nurse but wants to see lactation from here tomorrow. Support and encouragement given.     Mother reports she is exhausted, hasn't slept in two days and needs to recover. Infant is formula feeding at this time. Mechanism of milk production and maintenance discusses. Mother verbalized understanding. Declines pumping or direct breastfeeding at this time.     Mother verbalizes understanding of all education and counseling. Mother denies any further lactation needs or concerns at this time. Discussed lactation availability. Encouraged mother to call for assistance when needs arise.   "

## 2023-09-13 NOTE — LACTATION NOTE
This note was copied from a baby's chart.  Discussed practices that support optimal maternity care and  feeding such as immediate skin to skin, the magic first hour, the importance of the first feeding, and delaying routine procedures. Also discussed continued skin to skin contact, rooming-in, and feeding on cue. Discussed feeding choice with mother. Reviewed benefits of breastfeeding and risks of formula feeding. Mother states her intention is breastfeeding.    Able to achieve deep asymmetric latch to L side in football position. D/t maternal pain post C/S, mother has difficult time holding infant to breast. Able to position using pillows. Mother reports moderate nipple pain. Infant tucks lower lip/chin in, able to draw out chin and relieve pain. Pt reports cramping and general discomfort. Infant comes off L breast, everted, no signs of blisters, able to establish latch on R breast in modified side lying position for mother's comfort. Infant held to breast. Lips flanged and suck/swallow burst seen. Mother reports discomfort at breast. Infant removed from breast,nipple everted, lipstick appearance, no signs of blisters. Instructed mother to call for latching assistance. Reviewed signs of satiety, hunger cues, and that assistance is available whenever needed. Mother SERGIO.

## 2023-09-13 NOTE — PROGRESS NOTES
[facility] ([facility])  Obstetrics  Postpartum Progress Note    Patient Name: Betty Felipe  MRN: [id number]  Admission Date: 2023  Hospital Length of Stay: 2 days  Attending Physician: Ramona Madera MD  Primary Care Provider: Nay, Primary Doctor    Subjective:     Principal Problem:S/P primary low transverse     Hospital Course:  Cytotec IOL   : Doing ok this morning, rates pain 9/10 and would like an epidural but refusing a cervical exam. Discussed potential for cervix to still be unripe and discussed different pain medication options rather than epidural. Reviewed r/b/a, patient would like to move forward with epidural placement.     Patient ultimately underwent primary  for failure to progress without complication.  Transferred to mother baby unit for routine post partum care. Patient progressed well postoperatively without complication.          Interval History:     She is doing well this morning. She is tolerating a regular diet without nausea or vomiting. She has Garces catheter in place. She is not ambulating. She has not passed flatus, and has not a BM. Vaginal bleeding is mild. She denies fever or chills. Abdominal pain is mild and controlled with oral medications. She Is breastfeeding. She desires circumcision for her male baby: yes.    Objective:     Vital Signs (Most Recent):  Temp: 98.1 °F (36.7 °C) (23)  Pulse: 80 (23)  Resp: 18 (23)  BP: 108/65 (23)  SpO2: 97 % (23) Vital Signs (24h Range):  Temp:  [98.1 °F (36.7 °C)-99.5 °F (37.5 °C)] 98.1 °F (36.7 °C)  Pulse:  [] 80  Resp:  [18] 18  SpO2:  [95 %-100 %] 97 %  BP: ()/(46-97) 108/65     Weight: 73.7 kg (162 lb 7.7 oz)  Body mass index is 30.7 kg/m².      Intake/Output Summary (Last 24 hours) at 2023 0884  Last data filed at 2023 0530  Gross per 24 hour   Intake 2487.64 ml   Output 2835 ml   Net -347.36 ml         Significant Labs:  Lab  Results   Component Value Date    GROUPTRH B POS 2023    STREPBCULT (A) 2023     STREPTOCOCCUS AGALACTIAE (GROUP B)  In case of Penicillin allergy, call lab for further testing.  Beta-hemolytic streptococci are routinely susceptible to   penicillins,cephalosporins and carbapenems.       Recent Labs   Lab 230   HGB 11.0*   HCT 33.4*       I have personallly reviewed all pertinent lab results from the last 24 hours.    Physical Exam:   Constitutional: She is oriented to person, place, and time. She appears well-developed and well-nourished. No distress.       Cardiovascular:  Normal rate, regular rhythm, normal heart sounds and intact distal pulses.            No murmur heard.   Pulmonary/Chest: Effort normal and breath sounds normal. No respiratory distress. She has no wheezes. She has no rales.        Abdominal: Soft. Bowel sounds are normal. She exhibits abdominal incision (Pressure dressing in place, clear/dry/intact). She exhibits no distension. There is no abdominal tenderness. There is no guarding.   Uterine fundus firm, below umbilicus, mild tenderness (appropriate)             Musculoskeletal: Moves all extremeties. No edema.      Comments: No calf tenderness       Neurological: She is alert and oriented to person, place, and time.    Skin: Skin is warm and dry. No rash noted. She is not diaphoretic.          Assessment/Plan:     38 y.o. female  for:    * S/P primary low transverse   POD #0 s/p primary LTCS  Pt doing well, afebrile overnight.  Proceed with routine post-partum care, encouraged ambulation, aware ok to shower.  Pt counseled on management plan and discharge goals. Anticipate discharge in 2-3 days            Post-term pregnancy, 40-42 weeks of gestation  Cytotec IOL     GBS (group B Streptococcus carrier), +RV culture, currently pregnant  PCN prophylaxis     Pyelectasis of fetus on prenatal ultrasound  Notify peds following delivery         Disposition: As  patient meets milestones, will plan to discharge.    Jeannie León PA-C  Obstetrics  [facility] ([facility])

## 2023-09-13 NOTE — TRANSFER OF CARE
"Anesthesia Transfer of Care Note    Patient: Betty Felipe    Procedure(s) Performed: Procedure(s) (LRB):   SECTION (N/A)    Patient location: Labor and Delivery    Anesthesia Type: epidural    Transport from OR: Transported from OR on room air with adequate spontaneous ventilation    Post pain: adequate analgesia    Post assessment: no apparent anesthetic complications    Post vital signs: stable    Level of consciousness: awake    Nausea/Vomiting: no nausea/vomiting    Complications: none    Transfer of care protocol was followed      Last vitals:   Visit Vitals  /70   Pulse 72   Temp 37.4 °C (99.4 °F) (Oral)   Resp 18   Ht 5' 1" (1.549 m)   Wt 73.7 kg (162 lb 7.7 oz)   SpO2 100%   BMI 30.70 kg/m²     "

## 2023-09-13 NOTE — ANESTHESIA POSTPROCEDURE EVALUATION
Anesthesia Post Evaluation    Patient: Betty Felipe    Procedure(s) Performed: Procedure(s) (LRB):   SECTION (N/A)    Final Anesthesia Type: epidural      Patient location during evaluation: labor & delivery  Patient participation: Yes- Able to Participate  Level of consciousness: awake and alert  Post-procedure vital signs: reviewed and stable  Pain management: adequate  Airway patency: patent    PONV status at discharge: No PONV  Anesthetic complications: no      Cardiovascular status: blood pressure returned to baseline  Respiratory status: unassisted and spontaneous ventilation  Hydration status: euvolemic  Follow-up not needed.          Vitals Value Taken Time   /67 23 0150   Temp 37.4 °C (99.4 °F) 23 0000   Pulse 70 23 0210   Resp 18 23 0000   SpO2 100 % 23 0210   Vitals shown include unvalidated device data.      No case tracking events are documented in the log.      Pain/Leslie Score: Pain Rating Prior to Med Admin: 9 (2023  2:10 AM)  Pain Rating Post Med Admin: 7 (2023  3:01 AM)

## 2023-09-13 NOTE — PROGRESS NOTES
09/12/23 7066   TeleStork [facility] Note - Strip   Strip Reviewed by Estrellita Nurse? Yes   TeleStork [facility] Note - Communication   [place] Nurse Communicated with Bedside Nurse Regarding: Fetal Status;Other (See Note)  (POC)   TeleStork [facility] Note - Notification   Nurse Notified? Yes  (Spoke with Vivian EHRNANDEZ RE: POC.)

## 2023-09-13 NOTE — PLAN OF CARE
Pt alert and oriented. Pt. Delivered via primary c/s @ 0405. Delayed skin to skin while in OR. Breastfeeding. Uterus firm, lochia light throughout recovery. Postpartum pitocin given. VSS. GBS positive, adequately treated during trial of labor. Garces catheter in place; SCDs in place. Family support at bedside. Pt remained free of falls during shift, call light in reach, room free of clutter, side rails up X2, will continue to monitor.

## 2023-09-13 NOTE — LACTATION NOTE
Lactation Rounds:    Attempted to make rounds on patient. Hearing screen in patients room at this time. Will attempt to make rounds at later time.

## 2023-09-13 NOTE — ASSESSMENT & PLAN NOTE
POD #0 s/p primary LTCS  Pt doing well, afebrile overnight.  Proceed with routine post-partum care, encouraged ambulation, aware ok to shower.  Pt counseled on management plan and discharge goals. Anticipate discharge in 2-3 days

## 2023-09-13 NOTE — L&D DELIVERY NOTE
Section Procedure Note 2023    Pre-operative Diagnosis:    IUP 41w0d  Failure to progress    Post-operative Diagnosis: same    PROCEDURE: primary low transverse  section    Surgeon: Ramona Madera MD    Assistants: January Salas    Anesthesia: Spinal anesthesia    IV Fluids: 500mL    Estimated Blood Loss:  500mL    UOP: 50mL           Specimens: none           Complications:  None     Operative findings: viable male infant, OP position, 8lbs 7oz; normal bilateral fallopian tubes/ovaries. Levorotated uterus. 2 pedunculated fibroids left anteror upper uterus approximately 4cm & 2cm    Procedure Details   The patient was seen in LDR. The risks, benefits, complications, treatment options, and expected outcomes were discussed with the patient.  The patient concurred with the proposed plan, giving informed consent.  The patient was taken to Operating Room, identified as Betty Felipe and the procedure verified as  Delivery. A Time Out was held and the above information confirmed.    After confirming adequacy of regional anesthesia, the patient was draped and prepped in the usual sterile manner. A Pfannenstiel incision was made and carried down through the subcutaneous tissue to the fascia. Fascial incision was made and extended transversely. The fascia was  from the underlying rectus tissue superiorly and inferiorly. The peritoneum was identified and entered bluntly then extended superiorly and inferiorly with good visualization of the underlying bladder. The utero-vesical peritoneal reflection was adequately retracted from the lower uterine segment. A low transverse uterine incision was made. There was clear amniotic fluid noted. The fetal vertex was delivered atraumatically, bulb suctioned on the field, then fully delivered. Delayed cord clamping performed. The placenta was simply expressed and the uterine cavity was cleared of clots and debris. The uterine incision was reapproximated  with running locked sutures of #1 chromic.   Lavage was performed and hemostasis noted. The peritoneum and rectus muscles were re-approximated with 3-0 chromic. The fascia was then reapproximated with running sutures of #0 loop PDS. The skin was reapproximated with 4-0 monocryl in a subcuticular fashion. Instrument, sponge, and needle counts were correct prior the abdominal closure and at the conclusion of the case.            Disposition: to recovery PP room           Condition: stable

## 2023-09-13 NOTE — PLAN OF CARE
[facility]  Discharge Assessment    Primary Care Provider: No, Primary Doctor     OB Screen (most recent)       OB Screen - 23 0857          OB SCREEN    Assessment Type Discharge Planning Assessment     Source of Information patient;health record     Received Prenatal Care Yes     Any indications/suspicions for None     Is this a teen pregnancy No     Is the baby in NICU No     Indication for adoption/Safe Haven No     Indication for DME/post-acute needs No     HIV (+) No     Any congenital  disorders No     Fetal demise/ death No

## 2023-09-13 NOTE — PROGRESS NOTES
S: Resting comfortable with ADEOLA in place  O:  VS reviewed, afebrile   Vitals:    23 0000 23 0015 23 0030 23 0045   BP: 122/69 128/71 139/70 126/70   Pulse: 68 66 73 72   Resp: 18      Temp: 99.4 °F (37.4 °C)      TempSrc: Oral      SpO2: 100% 100% 100% 100%   Weight:       Height:           FHTs  135 cat 1 currently, but still has random subtle late decelerations  UC  2-5  SVE 5/70/-3 with caput and cervical swelling noted.     A: IUP @ 41w0d ;     Patient Active Problem List   Diagnosis    AMA (advanced maternal age) multigravida 35+, second trimester    Uterine fibroid during pregnancy, antepartum    FREDY positive    Pyelectasis of fetus on prenatal ultrasound    GBS (group B Streptococcus carrier), +RV culture, currently pregnant    Post-term pregnancy, 40-42 weeks of gestation       P:   Continue close monitoring of maternal/fetal status  In depth discussion regarding no cervical change and swollen cervix. Also, unable to titrate pitocin up due to FHTs. Patient agreeable and asking for primary . Reviewed r/b/a, consents signed. MD notified and will prep for .

## 2023-09-13 NOTE — SUBJECTIVE & OBJECTIVE
Interval History:     She is doing well this morning. She is tolerating a regular diet without nausea or vomiting. She has Garces catheter in place. She is not ambulating. She has not passed flatus, and has not a BM. Vaginal bleeding is mild. She denies fever or chills. Abdominal pain is mild and controlled with oral medications. She Is breastfeeding. She desires circumcision for her male baby: yes.    Objective:     Vital Signs (Most Recent):  Temp: 98.1 °F (36.7 °C) (09/13/23 0812)  Pulse: 80 (09/13/23 0812)  Resp: 18 (09/13/23 0812)  BP: 108/65 (09/13/23 0812)  SpO2: 97 % (09/13/23 0812) Vital Signs (24h Range):  Temp:  [98.1 °F (36.7 °C)-99.5 °F (37.5 °C)] 98.1 °F (36.7 °C)  Pulse:  [] 80  Resp:  [18] 18  SpO2:  [95 %-100 %] 97 %  BP: ()/(46-97) 108/65     Weight: 73.7 kg (162 lb 7.7 oz)  Body mass index is 30.7 kg/m².      Intake/Output Summary (Last 24 hours) at 9/13/2023 0845  Last data filed at 9/13/2023 0530  Gross per 24 hour   Intake 2487.64 ml   Output 2835 ml   Net -347.36 ml         Significant Labs:  Lab Results   Component Value Date    GROUPTRH B POS 09/11/2023    STREPBCULT (A) 08/17/2023     STREPTOCOCCUS AGALACTIAE (GROUP B)  In case of Penicillin allergy, call lab for further testing.  Beta-hemolytic streptococci are routinely susceptible to   penicillins,cephalosporins and carbapenems.       Recent Labs   Lab 09/11/23 2110   HGB 11.0*   HCT 33.4*       I have personallly reviewed all pertinent lab results from the last 24 hours.    Physical Exam:   Constitutional: She is oriented to person, place, and time. She appears well-developed and well-nourished. No distress.       Cardiovascular:  Normal rate, regular rhythm, normal heart sounds and intact distal pulses.            No murmur heard.   Pulmonary/Chest: Effort normal and breath sounds normal. No respiratory distress. She has no wheezes. She has no rales.        Abdominal: Soft. Bowel sounds are normal. She exhibits abdominal  incision (Pressure dressing in place, clear/dry/intact). She exhibits no distension. There is no abdominal tenderness. There is no guarding.   Uterine fundus firm, below umbilicus, mild tenderness (appropriate)             Musculoskeletal: Moves all extremeties. No edema.      Comments: No calf tenderness       Neurological: She is alert and oriented to person, place, and time.    Skin: Skin is warm and dry. No rash noted. She is not diaphoretic.

## 2023-09-14 PROCEDURE — 25000003 PHARM REV CODE 250: Performed by: OBSTETRICS & GYNECOLOGY

## 2023-09-14 PROCEDURE — 11000001 HC ACUTE MED/SURG PRIVATE ROOM

## 2023-09-14 RX ADMIN — SENNOSIDES AND DOCUSATE SODIUM 1 TABLET: 50; 8.6 TABLET ORAL at 06:09

## 2023-09-14 RX ADMIN — IBUPROFEN 800 MG: 800 TABLET, FILM COATED ORAL at 05:09

## 2023-09-14 RX ADMIN — IBUPROFEN 800 MG: 800 TABLET, FILM COATED ORAL at 10:09

## 2023-09-14 RX ADMIN — SIMETHICONE 80 MG: 80 TABLET, CHEWABLE ORAL at 04:09

## 2023-09-14 RX ADMIN — HYDROCODONE BITARTRATE AND ACETAMINOPHEN 1 TABLET: 10; 325 TABLET ORAL at 04:09

## 2023-09-14 RX ADMIN — IBUPROFEN 800 MG: 800 TABLET, FILM COATED ORAL at 02:09

## 2023-09-14 RX ADMIN — HYDROCODONE BITARTRATE AND ACETAMINOPHEN 1 TABLET: 10; 325 TABLET ORAL at 12:09

## 2023-09-14 NOTE — SUBJECTIVE & OBJECTIVE
"Interval History:     She is doing well this morning. She is tolerating a regular diet without nausea or vomiting. She is voiding spontaneously. She is ambulating. She has not passed flatus, and has not a BM. Vaginal bleeding is mild. She denies fever or chills. Abdominal pain is mild and controlled with oral medications. She Is breastfeeding. She desires circumcision for her male baby: yes.    Objective:     Vital Signs (Most Recent):  Temp: 98.4 °F (36.9 °C) (09/14/23 0819)  Pulse: 89 (09/14/23 0819)  Resp: 18 (09/14/23 0819)  BP: 117/62 (09/14/23 0819)  SpO2: 96 % (09/13/23 1616) Vital Signs (24h Range):  Temp:  [97.7 °F (36.5 °C)-99.3 °F (37.4 °C)] 98.4 °F (36.9 °C)  Pulse:  [86-95] 89  Resp:  [18-20] 18  SpO2:  [96 %-97 %] 96 %  BP: ()/(55-62) 117/62     Weight: 73.7 kg (162 lb 7.7 oz)  Body mass index is 30.7 kg/m².      Intake/Output Summary (Last 24 hours) at 9/14/2023 0843  Last data filed at 9/13/2023 2030  Gross per 24 hour   Intake --   Output 650 ml   Net -650 ml         Significant Labs:  Lab Results   Component Value Date    GROUPTRH B POS 09/11/2023    STREPBCULT (A) 08/17/2023     STREPTOCOCCUS AGALACTIAE (GROUP B)  In case of Penicillin allergy, call lab for further testing.  Beta-hemolytic streptococci are routinely susceptible to   penicillins,cephalosporins and carbapenems.       No results for input(s): "HGB", "HCT" in the last 48 hours.    I have personallly reviewed all pertinent lab results from the last 24 hours.    Physical Exam:   Constitutional: She is oriented to person, place, and time. She appears well-developed and well-nourished. No distress.       Cardiovascular:  Normal rate, regular rhythm, normal heart sounds and intact distal pulses.            No murmur heard.   Pulmonary/Chest: Effort normal and breath sounds normal. No respiratory distress. She has no wheezes. She has no rales.        Abdominal: Soft. Bowel sounds are normal. She exhibits abdominal incision (Aquacel " dressing in place, clear/dry/intact). She exhibits no distension. There is no abdominal tenderness. There is no guarding.   Uterine fundus firm, below umbilicus, mild tenderness (appropriate)             Musculoskeletal: Moves all extremeties. No edema.      Comments: No calf tenderness       Neurological: She is alert and oriented to person, place, and time.    Skin: Skin is warm and dry. No rash noted. She is not diaphoretic.

## 2023-09-14 NOTE — PROGRESS NOTES
[facility] ([facility])  Obstetrics  Postpartum Progress Note    Patient Name: Betty Felipe  MRN: [id number]  Admission Date: 2023  Hospital Length of Stay: 3 days  Attending Physician: Ramona Madera MD  Primary Care Provider: Nay, Primary Doctor    Subjective:     Principal Problem:S/P primary low transverse     Hospital Course:  Cytotec IOL   : Doing ok this morning, rates pain 9/10 and would like an epidural but refusing a cervical exam. Discussed potential for cervix to still be unripe and discussed different pain medication options rather than epidural. Reviewed r/b/a, patient would like to move forward with epidural placement.     Patient ultimately underwent primary  for failure to progress without complication.  Transferred to mother baby unit for routine post partum care. Patient progressed well postoperatively without complication.          Interval History:     She is doing well this morning. She is tolerating a regular diet without nausea or vomiting. She is voiding spontaneously. She is ambulating. She has not passed flatus, and has not a BM. Vaginal bleeding is mild. She denies fever or chills. Abdominal pain is mild and controlled with oral medications. She Is breastfeeding. She desires circumcision for her male baby: yes.    Objective:     Vital Signs (Most Recent):  Temp: 98.4 °F (36.9 °C) (23)  Pulse: 89 (23)  Resp: 18 (23)  BP: 117/62 (23)  SpO2: 96 % (23 1616) Vital Signs (24h Range):  Temp:  [97.7 °F (36.5 °C)-99.3 °F (37.4 °C)] 98.4 °F (36.9 °C)  Pulse:  [86-95] 89  Resp:  [18-20] 18  SpO2:  [96 %-97 %] 96 %  BP: ()/(55-62) 117/62     Weight: 73.7 kg (162 lb 7.7 oz)  Body mass index is 30.7 kg/m².      Intake/Output Summary (Last 24 hours) at 2023 0843  Last data filed at 2023  Gross per 24 hour   Intake --   Output 650 ml   Net -650 ml         Significant Labs:  Lab Results   Component  "Value Date    GROUPTRH B POS 2023    STREPBCULT (A) 2023     STREPTOCOCCUS AGALACTIAE (GROUP B)  In case of Penicillin allergy, call lab for further testing.  Beta-hemolytic streptococci are routinely susceptible to   penicillins,cephalosporins and carbapenems.       No results for input(s): "HGB", "HCT" in the last 48 hours.    I have personallly reviewed all pertinent lab results from the last 24 hours.    Physical Exam:   Constitutional: She is oriented to person, place, and time. She appears well-developed and well-nourished. No distress.       Cardiovascular:  Normal rate, regular rhythm, normal heart sounds and intact distal pulses.            No murmur heard.   Pulmonary/Chest: Effort normal and breath sounds normal. No respiratory distress. She has no wheezes. She has no rales.        Abdominal: Soft. Bowel sounds are normal. She exhibits abdominal incision (Aquacel dressing in place, clear/dry/intact). She exhibits no distension. There is no abdominal tenderness. There is no guarding.   Uterine fundus firm, below umbilicus, mild tenderness (appropriate)             Musculoskeletal: Moves all extremeties. No edema.      Comments: No calf tenderness       Neurological: She is alert and oriented to person, place, and time.    Skin: Skin is warm and dry. No rash noted. She is not diaphoretic.          Assessment/Plan:     38 y.o. female  for:    * S/P primary low transverse   POD #1 s/p primary LTCS  Pt doing well, afebrile overnight.  Proceed with routine post-partum care, encouraged ambulation, aware ok to shower.  Pt counseled on management plan and discharge goals. Anticipate discharge in 1-2 days            Post-term pregnancy, 40-42 weeks of gestation  Cytotec IOL     GBS (group B Streptococcus carrier), +RV culture, currently pregnant  PCN prophylaxis     Pyelectasis of fetus on prenatal ultrasound  Notify peds following delivery         Disposition: As patient meets " milestones, will plan to discharge.    Jeannie León PA-C  Obstetrics  [facility] ([facility])

## 2023-09-14 NOTE — PLAN OF CARE
VSS. Bleeding light without clots, fundus firm without massage. Pain controlled with scheduled pain meds and heat packs.

## 2023-09-14 NOTE — LACTATION NOTE
Lactation Rounds:    Infant weight gain +0.5% 1 void and 2 stools documented in the last 24 hours.     Mother is formula feeding. Uterine cramping is too intense at this time for breast stimulation per mother. Declines direct breastfeeding or pumping but would like to try breastpump tonight.     Antavo Symphony breast pump set up at bedside.  Instructed on proper usage and to adjust suction according to comfort level. Verified appropriate flange fit- 27 mm. Reviewed frequency and duration of pumping in order to promote and maintain full milk supply. Hands-on pumping technique reviewed. Encouraged hand expression after. Instructed on proper cleaning of breast pump parts. Reviewed proper milk handling, collection, storage, and transportation. Voices understanding.     Mother verbalizes understanding of all education and counseling. Mother denies any further lactation needs or concerns at this time. Discussed lactation availability. Encouraged mother to call for assistance when needs arise.

## 2023-09-14 NOTE — PLAN OF CARE
Problem: Adult Inpatient Plan of Care  Goal: Plan of Care Review  Outcome: Ongoing, Progressing  Goal: Patient-Specific Goal (Individualized)  Outcome: Ongoing, Progressing  Goal: Absence of Hospital-Acquired Illness or Injury  Outcome: Ongoing, Progressing  Goal: Optimal Comfort and Wellbeing  Outcome: Ongoing, Progressing  Goal: Readiness for Transition of Care  Outcome: Ongoing, Progressing     Problem: Infection  Goal: Absence of Infection Signs and Symptoms  Outcome: Ongoing, Progressing     Problem: Skin Injury Risk Increased  Goal: Skin Health and Integrity  Outcome: Ongoing, Progressing     Problem: Adjustment to Role Transition (Postpartum  Delivery)  Goal: Successful Maternal Role Transition  Outcome: Ongoing, Progressing     Problem: Bleeding (Postpartum  Delivery)  Goal: Hemostasis  Outcome: Ongoing, Progressing     Problem: Infection (Postpartum  Delivery)  Goal: Absence of Infection Signs and Symptoms  Outcome: Ongoing, Progressing     Problem: Pain (Postpartum  Delivery)  Goal: Acceptable Pain Control  Outcome: Ongoing, Progressing

## 2023-09-15 PROCEDURE — 11000001 HC ACUTE MED/SURG PRIVATE ROOM

## 2023-09-15 PROCEDURE — 25000003 PHARM REV CODE 250: Performed by: OBSTETRICS & GYNECOLOGY

## 2023-09-15 RX ADMIN — HYDROCODONE BITARTRATE AND ACETAMINOPHEN 1 TABLET: 5; 325 TABLET ORAL at 01:09

## 2023-09-15 RX ADMIN — HYDROCODONE BITARTRATE AND ACETAMINOPHEN 1 TABLET: 5; 325 TABLET ORAL at 07:09

## 2023-09-15 RX ADMIN — IBUPROFEN 800 MG: 800 TABLET, FILM COATED ORAL at 09:09

## 2023-09-15 RX ADMIN — IBUPROFEN 800 MG: 800 TABLET, FILM COATED ORAL at 02:09

## 2023-09-15 RX ADMIN — IBUPROFEN 800 MG: 800 TABLET, FILM COATED ORAL at 05:09

## 2023-09-15 RX ADMIN — HYDROCODONE BITARTRATE AND ACETAMINOPHEN 1 TABLET: 10; 325 TABLET ORAL at 12:09

## 2023-09-15 RX ADMIN — HYDROCODONE BITARTRATE AND ACETAMINOPHEN 1 TABLET: 5; 325 TABLET ORAL at 09:09

## 2023-09-15 NOTE — PLAN OF CARE
Patient afebrile and had no falls this shift. Fundus firm without massage and below umbilicus. Bleeding light, no clots passed this shift. Voids spontaneously. Ambulates independently. Educated patient about walking. Patient said she walked in the room. I have not seen patient get up. Patient and family member wants to make a complaint about EVS not changing sheets and cleaning room every shift. Patient and patient family member says she can not walk if she has to clean the bathroom herself. Pain controlled with oral pain medication. Vital signs stable at this time. Bonding well with infant; responds to infant cues and participates in infant care. Patient plan of care ongoing.        Problem: Adult Inpatient Plan of Care  Goal: Plan of Care Review  Outcome: Ongoing, Progressing  Goal: Patient-Specific Goal (Individualized)  Outcome: Ongoing, Progressing  Goal: Absence of Hospital-Acquired Illness or Injury  Outcome: Ongoing, Progressing  Goal: Optimal Comfort and Wellbeing  Outcome: Ongoing, Progressing  Goal: Readiness for Transition of Care  Outcome: Ongoing, Progressing     Problem: Infection  Goal: Absence of Infection Signs and Symptoms  Outcome: Ongoing, Progressing     Problem: Skin Injury Risk Increased  Goal: Skin Health and Integrity  Outcome: Ongoing, Progressing     Problem: Adjustment to Role Transition (Postpartum  Delivery)  Goal: Successful Maternal Role Transition  Outcome: Ongoing, Progressing     Problem: Bleeding (Postpartum  Delivery)  Goal: Hemostasis  Outcome: Ongoing, Progressing     Problem: Infection (Postpartum  Delivery)  Goal: Absence of Infection Signs and Symptoms  Outcome: Ongoing, Progressing     Problem: Pain (Postpartum  Delivery)  Goal: Acceptable Pain Control  Outcome: Ongoing, Progressing

## 2023-09-15 NOTE — PLAN OF CARE
VSS. Breast and bottle feeding. Bleeding light without clots, fundus firm without massage. Pain controlled with scheduled and PRN pain meds.

## 2023-09-15 NOTE — ASSESSMENT & PLAN NOTE
POD #2 s/p primary LTCS  Pt doing well, afebrile overnight. Change dressing today. Proceed with routine post-partum care, encouraged ambulation, aware ok to shower.  Pt counseled on management plan and discharge goals. Anticipate discharge in 1-2 days

## 2023-09-15 NOTE — SUBJECTIVE & OBJECTIVE
"Interval History:     She is doing well this morning. She is tolerating a regular diet without nausea or vomiting. She is voiding spontaneously. She is ambulating. She has passed flatus, and has not a BM. Vaginal bleeding is mild. She denies fever or chills. Abdominal pain is mild and controlled with oral medications. She Is not breastfeeding. She desires circumcision for her male baby: yes.    Objective:     Vital Signs (Most Recent):  Temp: 98.9 °F (37.2 °C) (09/15/23 0354)  Pulse: 77 (09/15/23 0354)  Resp: 18 (09/15/23 0354)  BP: 99/61 (09/15/23 0354)  SpO2: 96 % (09/13/23 1616) Vital Signs (24h Range):  Temp:  [96.2 °F (35.7 °C)-99.1 °F (37.3 °C)] 98.9 °F (37.2 °C)  Pulse:  [77-89] 77  Resp:  [16-18] 18  BP: ()/(51-64) 99/61     Weight: 73.7 kg (162 lb 7.7 oz)  Body mass index is 30.7 kg/m².    No intake or output data in the 24 hours ending 09/15/23 0802      Significant Labs:  Lab Results   Component Value Date    GROUPTRH B POS 09/11/2023    STREPBCULT (A) 08/17/2023     STREPTOCOCCUS AGALACTIAE (GROUP B)  In case of Penicillin allergy, call lab for further testing.  Beta-hemolytic streptococci are routinely susceptible to   penicillins,cephalosporins and carbapenems.       No results for input(s): "HGB", "HCT" in the last 48 hours.    I have personallly reviewed all pertinent lab results from the last 24 hours.    Physical Exam:   Constitutional: She is oriented to person, place, and time. She appears well-developed and well-nourished. No distress.       Cardiovascular:  Normal rate, regular rhythm, normal heart sounds and intact distal pulses.            No murmur heard.   Pulmonary/Chest: Effort normal and breath sounds normal. No respiratory distress. She has no wheezes. She has no rales.        Abdominal: Soft. Bowel sounds are normal. She exhibits abdominal incision (Aquacel dressing in place, drainage to right side). She exhibits no distension. There is no abdominal tenderness. There is no " guarding.   Uterine fundus firm, below umbilicus, mild tenderness (appropriate)             Musculoskeletal: Moves all extremeties. No edema.      Comments: No calf tenderness       Neurological: She is alert and oriented to person, place, and time.    Skin: Skin is warm and dry. No rash noted. She is not diaphoretic.

## 2023-09-15 NOTE — LACTATION NOTE
This note was copied from a baby's chart.  Mother would not like lactation services at this time. Mother states that she will call should she change her mind about desiring lactation services or is in need of lactation assistance.

## 2023-09-15 NOTE — PROGRESS NOTES
[facility] ([facility])  Obstetrics  Postpartum Progress Note    Patient Name: Betty Felipe  MRN: [id number]  Admission Date: 2023  Hospital Length of Stay: 4 days  Attending Physician: Ramona Madera MD  Primary Care Provider: Nay, Primary Doctor    Subjective:     Principal Problem:S/P primary low transverse     Hospital Course:  Cytotec IOL   : Doing ok this morning, rates pain 9/10 and would like an epidural but refusing a cervical exam. Discussed potential for cervix to still be unripe and discussed different pain medication options rather than epidural. Reviewed r/b/a, patient would like to move forward with epidural placement.     Patient ultimately underwent primary  for failure to progress without complication.  Transferred to mother baby unit for routine post partum care. Patient progressed well postoperatively without complication.          Interval History:     She is doing well this morning. She is tolerating a regular diet without nausea or vomiting. She is voiding spontaneously. She is ambulating. She has passed flatus, and has not a BM. Vaginal bleeding is mild. She denies fever or chills. Abdominal pain is mild and controlled with oral medications. She Is not breastfeeding. She desires circumcision for her male baby: yes.    Objective:     Vital Signs (Most Recent):  Temp: 98.9 °F (37.2 °C) (09/15/23 0354)  Pulse: 77 (09/15/23 0354)  Resp: 18 (09/15/23 0354)  BP: 99/61 (09/15/23 0354)  SpO2: 96 % (23 1616) Vital Signs (24h Range):  Temp:  [96.2 °F (35.7 °C)-99.1 °F (37.3 °C)] 98.9 °F (37.2 °C)  Pulse:  [77-89] 77  Resp:  [16-18] 18  BP: ()/(51-64) 99/61     Weight: 73.7 kg (162 lb 7.7 oz)  Body mass index is 30.7 kg/m².    No intake or output data in the 24 hours ending 09/15/23 0802      Significant Labs:  Lab Results   Component Value Date    GROUPTRH B POS 2023    STREPBCULT (A) 2023     STREPTOCOCCUS AGALACTIAE (GROUP B)  In case of  "Penicillin allergy, call lab for further testing.  Beta-hemolytic streptococci are routinely susceptible to   penicillins,cephalosporins and carbapenems.       No results for input(s): "HGB", "HCT" in the last 48 hours.    I have personallly reviewed all pertinent lab results from the last 24 hours.    Physical Exam:   Constitutional: She is oriented to person, place, and time. She appears well-developed and well-nourished. No distress.       Cardiovascular:  Normal rate, regular rhythm, normal heart sounds and intact distal pulses.            No murmur heard.   Pulmonary/Chest: Effort normal and breath sounds normal. No respiratory distress. She has no wheezes. She has no rales.        Abdominal: Soft. Bowel sounds are normal. She exhibits abdominal incision (Aquacel dressing in place, drainage to right side). She exhibits no distension. There is no abdominal tenderness. There is no guarding.   Uterine fundus firm, below umbilicus, mild tenderness (appropriate)             Musculoskeletal: Moves all extremeties. No edema.      Comments: No calf tenderness       Neurological: She is alert and oriented to person, place, and time.    Skin: Skin is warm and dry. No rash noted. She is not diaphoretic.            Assessment/Plan:     38 y.o. female  for:    * S/P primary low transverse   POD #2 s/p primary LTCS  Pt doing well, afebrile overnight. Change dressing today. Proceed with routine post-partum care, encouraged ambulation, aware ok to shower.  Pt counseled on management plan and discharge goals. Anticipate discharge in 1-2 days            Post-term pregnancy, 40-42 weeks of gestation  Cytotec IOL     GBS (group B Streptococcus carrier), +RV culture, currently pregnant  PCN prophylaxis     Pyelectasis of fetus on prenatal ultrasound  Notify peds following delivery         Disposition: As patient meets milestones, will plan to discharge.    Jeannie León PA-C  Obstetrics  [facility] " ([facility])

## 2023-09-16 VITALS
BODY MASS INDEX: 30.68 KG/M2 | SYSTOLIC BLOOD PRESSURE: 111 MMHG | HEIGHT: 61 IN | RESPIRATION RATE: 18 BRPM | WEIGHT: 162.5 LBS | HEART RATE: 69 BPM | OXYGEN SATURATION: 99 % | DIASTOLIC BLOOD PRESSURE: 66 MMHG | TEMPERATURE: 99 F

## 2023-09-16 PROBLEM — O09.522 AMA (ADVANCED MATERNAL AGE) MULTIGRAVIDA 35+, SECOND TRIMESTER: Status: RESOLVED | Noted: 2023-04-14 | Resolved: 2023-09-16

## 2023-09-16 PROBLEM — O99.820 GBS (GROUP B STREPTOCOCCUS CARRIER), +RV CULTURE, CURRENTLY PREGNANT: Status: RESOLVED | Noted: 2023-08-19 | Resolved: 2023-09-16

## 2023-09-16 PROBLEM — O48.0 POST-TERM PREGNANCY, 40-42 WEEKS OF GESTATION: Status: RESOLVED | Noted: 2023-09-11 | Resolved: 2023-09-16

## 2023-09-16 PROBLEM — O35.EXX0 PYELECTASIS OF FETUS ON PRENATAL ULTRASOUND: Status: RESOLVED | Noted: 2023-08-17 | Resolved: 2023-09-16

## 2023-09-16 PROBLEM — O34.10 UTERINE FIBROID DURING PREGNANCY, ANTEPARTUM: Status: RESOLVED | Noted: 2023-05-02 | Resolved: 2023-09-16

## 2023-09-16 PROBLEM — D25.9 UTERINE FIBROID DURING PREGNANCY, ANTEPARTUM: Status: RESOLVED | Noted: 2023-05-02 | Resolved: 2023-09-16

## 2023-09-16 PROCEDURE — 25000003 PHARM REV CODE 250: Performed by: OBSTETRICS & GYNECOLOGY

## 2023-09-16 RX ORDER — HYDROCODONE BITARTRATE AND ACETAMINOPHEN 5; 325 MG/1; MG/1
1 TABLET ORAL EVERY 4 HOURS PRN
Qty: 20 TABLET | Refills: 0 | Status: SHIPPED | OUTPATIENT
Start: 2023-09-16 | End: 2023-10-03

## 2023-09-16 RX ORDER — IBUPROFEN 800 MG/1
800 TABLET ORAL EVERY 8 HOURS
Qty: 30 TABLET | Refills: 0 | Status: SHIPPED | OUTPATIENT
Start: 2023-09-16 | End: 2023-09-26

## 2023-09-16 RX ORDER — FUROSEMIDE 20 MG/1
20 TABLET ORAL DAILY
Qty: 7 TABLET | Refills: 0 | Status: SHIPPED | OUTPATIENT
Start: 2023-09-16 | End: 2023-10-03

## 2023-09-16 RX ADMIN — IBUPROFEN 800 MG: 800 TABLET, FILM COATED ORAL at 06:09

## 2023-09-16 RX ADMIN — HYDROCODONE BITARTRATE AND ACETAMINOPHEN 1 TABLET: 5; 325 TABLET ORAL at 04:09

## 2023-09-16 NOTE — ASSESSMENT & PLAN NOTE
POD #2 s/p primary LTCS  Pt doing well, afebrile overnight. Change dressing today. Proceed with routine post-partum care, encouraged ambulation, aware ok to shower.  Pt counseled on management plan and discharge goals. Anticipate discharge in 1-2 days  09/16/2023  Pod #2 s/p primary c/section  Afebrile  Tolerating diet, +ambulation, +void  Pain controlled  Stable for discharge

## 2023-09-16 NOTE — NURSING
Discharge instructions given at the bedside. Patient verbalized understanding and has no further questions at this time.  will bring belongings to car and call when he is ready for pt to come down.

## 2023-09-16 NOTE — LACTATION NOTE
Mother reports that the baby is latching well and formula feeding infant. Mother denies need for lactation services.    Mother anticipates discharge home today. Reviewed signs of good attachment. Reviewed breast massage and compression during feedings and indications for use. Reviewed signs of effective milk transfer and instructed to call pediatrician and lactation if signs not present. Discussed expected feeding and output pattern for days of life 3, 4, & 5+; mother instructed to call pediatrician and lactation if infant is not meeting feeding and output goals.     Reviewed signs of engorgement and expectant management. Reviewed signs of mastitis and instructed mother to call OB provider and lactation if any signs present. Discussed proper use of First Alert Form. Reviewed proper milk handling, collection and storage guidelines. Reviewed nursing diet and nutrition. Discussed resources for medication safety while breastfeeding. Reviewed available outpatient lactation resources.     Mother verbalizes understanding of all education and counseling; she denies any further lactation needs or concerns at this time. Encouraged mother to contact lactation with any questions, concerns, or problems, contact number provided.

## 2023-09-16 NOTE — SUBJECTIVE & OBJECTIVE
"Interval History:   Pod #3    She is doing well this morning. She is tolerating a regular diet without nausea or vomiting. She is voiding spontaneously. She is ambulating. She has passed flatus, and has a BM. Vaginal bleeding is mild. She denies fever or chills. Abdominal pain is mild and controlled with oral medications. She Is breastfeeding.   Objective:     Vital Signs (Most Recent):  Temp: 98.7 °F (37.1 °C) (09/16/23 0757)  Pulse: 69 (09/16/23 0757)  Resp: 18 (09/16/23 0757)  BP: 111/66 (09/16/23 0757)  SpO2: 99 % (09/16/23 0005) Vital Signs (24h Range):  Temp:  [98.2 °F (36.8 °C)-98.9 °F (37.2 °C)] 98.7 °F (37.1 °C)  Pulse:  [69-83] 69  Resp:  [16-18] 18  SpO2:  [98 %-99 %] 99 %  BP: ()/(52-70) 111/66     Weight: 73.7 kg (162 lb 7.7 oz)  Body mass index is 30.7 kg/m².    No intake or output data in the 24 hours ending 09/16/23 0815      Significant Labs:  Lab Results   Component Value Date    GROUPTRH B POS 09/11/2023    STREPBCULT (A) 08/17/2023     STREPTOCOCCUS AGALACTIAE (GROUP B)  In case of Penicillin allergy, call lab for further testing.  Beta-hemolytic streptococci are routinely susceptible to   penicillins,cephalosporins and carbapenems.       No results for input(s): "HGB", "HCT" in the last 48 hours.    I have personallly reviewed all pertinent lab results from the last 24 hours.  Recent Lab Results       None            Physical Exam:   Constitutional: She is oriented to person, place, and time. She appears well-developed.    HENT:   Head: Normocephalic.    Eyes: Pupils are equal, round, and reactive to light.     Cardiovascular:  Normal rate and regular rhythm.             Pulmonary/Chest: Effort normal and breath sounds normal.        Abdominal: Soft. Bowel sounds are normal. She exhibits no abdominal incision (aquasel intact).     Genitourinary:    Genitourinary Comments: Ut--firm, non tender             Musculoskeletal: Normal range of motion and moves all extremeties. Edema (1+) present. "       Neurological: She is alert and oriented to person, place, and time. She has normal reflexes.    Skin: Skin is warm and dry.    Psychiatric: She has a normal mood and affect. Her behavior is normal. Judgment and thought content normal.       Review of Systems   All other systems reviewed and are negative.

## 2023-09-16 NOTE — PLAN OF CARE
Problem: Adult Inpatient Plan of Care  Goal: Plan of Care Review  Outcome: Met  Goal: Patient-Specific Goal (Individualized)  Outcome: Met  Goal: Absence of Hospital-Acquired Illness or Injury  Outcome: Met  Goal: Optimal Comfort and Wellbeing  Outcome: Met  Goal: Readiness for Transition of Care  Outcome: Met     Problem: Infection  Goal: Absence of Infection Signs and Symptoms  Outcome: Met     Problem: Skin Injury Risk Increased  Goal: Skin Health and Integrity  Outcome: Met     Problem: Adjustment to Role Transition (Postpartum  Delivery)  Goal: Successful Maternal Role Transition  Outcome: Met     Problem: Bleeding (Postpartum  Delivery)  Goal: Hemostasis  Outcome: Met     Problem: Infection (Postpartum  Delivery)  Goal: Absence of Infection Signs and Symptoms  Outcome: Met     Problem: Pain (Postpartum  Delivery)  Goal: Acceptable Pain Control  Outcome: Met

## 2023-09-16 NOTE — DISCHARGE SUMMARY
[facility] ([facility])  Obstetrics  Discharge Summary      Patient Name: Betty Felipe  MRN: [id number]  Admission Date: 2023  Hospital Length of Stay: 5 days  Discharge Date and Time:  2023 8:22 AM  Attending Physician: Ramona Madera MD   Discharging Provider: Tosha Cardona MD   Primary Care Provider: Nay, Primary Doctor    HPI: 38 y.o.  CEDRICK 23 EGA 40w5d here for IOL for post dates pregnancy           Procedure(s) (LRB):   SECTION (N/A)     Hospital Course:   Cytotec IOL   : Doing ok this morning, rates pain 9/10 and would like an epidural but refusing a cervical exam. Discussed potential for cervix to still be unripe and discussed different pain medication options rather than epidural. Reviewed r/b/a, patient would like to move forward with epidural placement.     Patient ultimately underwent primary  for failure to progress without complication.  Transferred to mother baby unit for routine post partum care. Patient progressed well postoperatively without complication.               Final Active Diagnoses:    Diagnosis Date Noted POA    PRINCIPAL PROBLEM:  S/P primary low transverse  [Z98.891] 2023 Not Applicable      Problems Resolved During this Admission:    Diagnosis Date Noted Date Resolved POA    Post-term pregnancy, 40-42 weeks of gestation [O48.0] 2023 Yes    GBS (group B Streptococcus carrier), +RV culture, currently pregnant [O99.820] 2023 Not Applicable    Pyelectasis of fetus on prenatal ultrasound [O35.EXX0] 2023 Yes        Significant Diagnostic Studies: Labs: All labs within the past 24 hours have been reviewed      Feeding Method: breast    Immunizations     Date Immunization Status Dose Route/Site Given by    23 MMR Incomplete 0.5 mL Subcutaneous/     23 Tdap Incomplete 0.5 mL Intramuscular/           Delivery:    Episiotomy: None   Lacerations: None  "  Repair suture: None   Repair # of packets:     Blood loss (ml):       Birth information:  YOB: 2023   Time of birth: 2:38 AM   Sex: male   Delivery type: , Low Transverse   Gestational Age: 41w0d     Measurements    Weight: 3820 g  Weight (lbs): 8 lb 6.8 oz  Length: 54 cm  Length (in): 21.25"  Head circumference: 37.5 cm  Chest circumference: 34.5 cm  Abdominal girth: 34 cm         Delivery Clinician: Delivery Providers    Delivering clinician: Ramona Madera MD   Provider Role    Eusebia Weeks RN Registered Nurse    Macy Anderson RN Registered Nurse    Beth Garnica, [facility]    Jose Dorman Jr., CRNA Nurse Anesthetist           Additional  information:  Forceps:    Vacuum:    Breech:    Observed anomalies      Living?:     Apgars    Living status: Living  Apgar Component Scores:  1 min.:  5 min.:  10 min.:  15 min.:  20 min.:    Skin color:  0  1       Heart rate:  2  2       Reflex irritability:  2  2       Muscle tone:  2  2       Respiratory effort:  2  2       Total:  8  9       Apgars assigned by: ALEKSANDRA BROOKS         Placenta: Delivered:       appearance    Pending Diagnostic Studies:     None          Discharged Condition: good    Disposition: Home or Self Care    Follow Up:   Follow-up Information     [facility] Follow up in 1 week(s).    Why: bandage removal           Maddy Carter CNM Follow up in 4 day(s).    Specialty: Obstetrics and Gynecology  Why: PP exam  Contact information:  [address] [place] [address] [address] Jennifer [place]NE [id number] [phone]                       Patient Instructions:      Diet Adult Regular     Other restrictions (specify):   Order Comments: Showers only for 6 weeks     No driving until:   Order Comments: No driving while using narcotic rx     Pelvic Rest   Order Comments: For 6 wks--no tampons, intercourse, douching     Leave dressing on - Keep it clean, dry, and intact until clinic visit     Notify your health care provider if " you experience any of the following:  temperature >100.4     Notify your health care provider if you experience any of the following:  persistent nausea and vomiting or diarrhea     Notify your health care provider if you experience any of the following:  severe uncontrolled pain     Notify your health care provider if you experience any of the following:  difficulty breathing or increased cough     Notify your health care provider if you experience any of the following:  increased confusion or weakness     Medications:  Current Discharge Medication List      START taking these medications    Details   furosemide (LASIX) 20 MG tablet Take 1 tablet (20 mg total) by mouth once daily. for 7 doses  Qty: 7 tablet, Refills: 0      HYDROcodone-acetaminophen (NORCO) 5-325 mg per tablet Take 1 tablet by mouth every 4 (four) hours as needed for Pain.  Qty: 20 tablet, Refills: 0    Comments: Quantity prescribed more than 7 day supply? No      ibuprofen (ADVIL,MOTRIN) 800 MG tablet Take 1 tablet (800 mg total) by mouth every 8 (eight) hours. for 10 days  Qty: 30 tablet, Refills: 0         CONTINUE these medications which have NOT CHANGED    Details   prenatal vit103-iron fum-folic () 27 mg iron- 1 mg Tab Take 1 tablet by mouth once daily.  Qty: 30 tablet, Refills: 11    Associated Diagnoses: AMA (advanced maternal age) multigravida 35+, second trimester         STOP taking these medications       aspirin (ECOTRIN) 81 MG EC tablet Comments:   Reason for Stopping:         ondansetron (ZOFRAN-ODT) 4 MG TbDL Comments:   Reason for Stopping:               Tosha Cardona MD  Obstetrics  [facility] ([facility])

## 2023-09-16 NOTE — DISCHARGE INSTRUCTIONS
"Mother Self Care:    Activity: Avoid strenuous exercise and get adequate rest.  No driving until the physician consent given.  Emotional Changes: Most women find birth to be a time of great emotional upheaval.  Sense of loss, mood swings, fatigue, anxiety, and feeling "let down" are common.  If feelings worsen or last more than a week, call your physician.  Breast Care/Breastfeeding: Wear a bra for comfort.  Keep nipples dry and apply your own breast milk or lanolin cream as needed for soreness.  Engorgement can be relieved with warm, moist heat before feedings.  You may also take Ibuprofen.  Breast Care/Bottle Feeding: Wear support bra 24 hours a day for one week.  Avoid stimulation to breasts.  You may use ice packs for discomfort.  Luis-Care/Vaginal Bleeding: Remember to use your luis-bottle after urinating.  Your flow will change from red, to pink, to yellow/white color over a period of 2 weeks.  Menstruation will return in 3-8 weeks, or longer if breastfeeding.  Episiotomy Vaginal Delivery: Stitches will dissolve within 10 days to 3 weeks.  Warm baths, tucks, and dermoplast will promote healing.  Avoid bubble baths or strong soaps.   Section/Tubal Ligation: Keep incision clean and dry. You may shower, but avoid baths. Please continue to use Nozin twice a day for 7 days after surgery. Ensure you attend your 1 week post op visit to have your dressing removed. Use antibacterial soap to wash your entire body until directed otherwise by a Physician, it is very important to shower daily.   Sexual Activity/Pelvic Rest: No sexual activity, tampons, or douching until your physician gives you consent.  Diet: Continue to eat from the five basic food groups, including plenty of protein, fruits, vegetables, and whole grains.  Limit empty calories and high fat foods.  Drink enough fluids to satisfy thirst and add an extra 500 calories for breastfeeding.  Constipation/Hemorrhoids: Drink plenty of water.  You may take " a stool softener or natural laxative (Metamucil). You may use tucks or hemorrhoid ointment and soak in a warm tub.    CALL YOUR OB DOCTOR IF ANY OF THE FOLLOWING OCCURS:  *Heavy bleeding - saturating a pad an hour or passing any large (2-3 inches in size) blood clots.  *Any pain, redness, or tenderness in lower leg.  *You cannot care for yourself or your baby.  *Any signs of infection-      - Temperature greater than 100.5 degrees F      - Foul smelling vaginal discharge and/or incisional drainage      - Increased episiotomy or incisional pain      - Hot, hard, red or sore area on breast      - Flu-like symptoms      - Any urgency, frequency or burning with urination    Return To the Hospital for further Evaluation:  Headache not relieved by tylenol or ibuprofen  Blurry vision, double vision, seeing spots, or flashing lights  Feeling faint or passing out  Right epigastric pain  Difficulty breathing  Swelling in hands, face, or feet  Any of these symptoms accompanied by nausea/vomiting  Gaining more than 5 pounds in one week  Seizures  These symptoms could be an indication of elevated blood pressure.     For patients that were treated for high blood pressure during pregnancy and or your hospital stay you will need a blood pressure check three days after you leave the hospital. Your nursing staff will assist you in an appointment if needed. If you have Connected Mom you may use your blood pressure cuff and report any readings 140/90 to your provider immediately.       If you have any questions that need to be answered immediately please call the Mother-Baby Unit at [phone] and ask to speak to a nurse.      Please see [facility] folder for additional information and handouts.

## 2023-09-16 NOTE — PLAN OF CARE
Patient afebrile and had no falls this shift. Fundus firm without massage and below umbilicus. Bleeding light, no clots passed this shift. Voids spontaneously. Ambulates independently. Pain well controlled with oral pain medication. Vital signs stable at this time. Baby is discharged but rooming in but Bonding well with infant; responds to infant cues and participates in infant care. Will continue to monitor.

## 2023-09-22 ENCOUNTER — POSTPARTUM VISIT (OUTPATIENT)
Dept: OBSTETRICS AND GYNECOLOGY | Facility: CLINIC | Age: 38
End: 2023-09-22
Payer: COMMERCIAL

## 2023-09-22 VITALS — BODY MASS INDEX: 27.49 KG/M2 | SYSTOLIC BLOOD PRESSURE: 118 MMHG | DIASTOLIC BLOOD PRESSURE: 74 MMHG | WEIGHT: 145.5 LBS

## 2023-09-22 DIAGNOSIS — Z98.891 S/P PRIMARY LOW TRANSVERSE C-SECTION: Primary | ICD-10-CM

## 2023-09-22 DIAGNOSIS — N30.90 CYSTITIS: ICD-10-CM

## 2023-09-22 PROCEDURE — 99024 PR POST-OP FOLLOW-UP VISIT: ICD-10-PCS | Mod: S$GLB,,, | Performed by: OBSTETRICS & GYNECOLOGY

## 2023-09-22 PROCEDURE — 99999 PR PBB SHADOW E&M-EST. PATIENT-LVL III: CPT | Mod: PBBFAC,,,

## 2023-09-22 PROCEDURE — 87086 URINE CULTURE/COLONY COUNT: CPT | Performed by: PHYSICIAN ASSISTANT

## 2023-09-22 PROCEDURE — 99999 PR PBB SHADOW E&M-EST. PATIENT-LVL III: ICD-10-PCS | Mod: PBBFAC,,,

## 2023-09-22 PROCEDURE — 99024 POSTOP FOLLOW-UP VISIT: CPT | Mod: S$GLB,,, | Performed by: OBSTETRICS & GYNECOLOGY

## 2023-09-22 RX ORDER — AMOXICILLIN AND CLAVULANATE POTASSIUM 875; 125 MG/1; MG/1
1 TABLET, FILM COATED ORAL EVERY 12 HOURS
Qty: 14 TABLET | Refills: 0 | Status: SHIPPED | OUTPATIENT
Start: 2023-09-22 | End: 2023-09-26 | Stop reason: SDUPTHER

## 2023-09-22 RX ORDER — PHENAZOPYRIDINE HYDROCHLORIDE 200 MG/1
200 TABLET, FILM COATED ORAL 3 TIMES DAILY PRN
Qty: 15 TABLET | Refills: 0 | Status: SHIPPED | OUTPATIENT
Start: 2023-09-22 | End: 2023-09-25

## 2023-09-22 NOTE — PROGRESS NOTES
Subjective:      Patient ID: Betty Felipe is a 38 y.o. female.    Chief Complaint:  Postpartum Care      History of Present Illness  HPI  Postoperative Follow-up  Patient presents to the clinic 1 weeks status post  for  post op follow up and dressing removal . Eating a regular diet without difficulty. Bowel movements are normal. Pain is controlled with current analgesics. Medications being used: ibuprofen (OTC).  Reports having burning with urination for the past several days. More suprapubic pressure and cramping. No fever/chills.  GYN & OB History  No LMP recorded.   Date of Last Pap: No result found    OB History    Para Term  AB Living   1 1 1     1   SAB IAB Ectopic Multiple Live Births         0 1      # Outcome Date GA Lbr Soham/2nd Weight Sex Delivery Anes PTL Lv   1 Term 23 41w0d  3.82 kg (8 lb 6.8 oz) M CS-LTranv EPI, Spinal N JUANY      Complications: Fetal Intolerance, Failure to progress in labor       Review of Systems  Review of Systems   Constitutional:  Negative for activity change, chills, fatigue and fever.   Respiratory:  Negative for cough.    Cardiovascular:  Negative for chest pain and leg swelling.   Gastrointestinal:  Negative for abdominal pain, constipation, nausea and vomiting.   Genitourinary:  Positive for dysuria. Negative for frequency, hematuria, pelvic pain, urgency, vaginal bleeding and vaginal discharge.   Integumentary:  Negative for rash.          Objective:     Physical Exam:   Constitutional: She is oriented to person, place, and time. She appears well-developed and well-nourished. No distress.       Cardiovascular:  Normal rate.             Pulmonary/Chest: Effort normal. No respiratory distress.        Abdominal: Soft. She exhibits abdominal incision (Aquacel dressing in place, clear/dry/intact; dressing removed and incision intact and healing well). She exhibits no distension. There is no abdominal tenderness. There is no guarding.              Musculoskeletal: Moves all extremeties. Edema present.      Comments: No calf tenderness       Neurological: She is alert and oriented to person, place, and time.    Skin: Skin is warm and dry. No rash noted. She is not diaphoretic.          Problem List Items Addressed This Visit          Obstetric    S/P primary low transverse  - Primary     Other Visit Diagnoses       Cystitis        Relevant Medications    amoxicillin-clavulanate 875-125mg (AUGMENTIN) 875-125 mg per tablet    phenazopyridine (PYRIDIUM) 200 MG tablet    Other Relevant Orders    Urine culture        +++leukocytes in UA with symptoms, start augmentin and follow up urine culture  Compression stockings for swelling  Patient doing well post op  Patient is scheduled for postpartum visit with Dr Roque.    Reviewed all restrictions & limitations with the patient. Advised to call clinic in event of fever, redness or drainage around the incision, worsening pain, or any any concerning issues or symptoms.  Instructed the importance of showering daily, no tub baths, using an antibacterial soap.  Patient verbalized understanding.

## 2023-09-24 LAB
BACTERIA UR CULT: NORMAL
BACTERIA UR CULT: NORMAL

## 2023-09-25 ENCOUNTER — PATIENT MESSAGE (OUTPATIENT)
Dept: OBSTETRICS AND GYNECOLOGY | Facility: CLINIC | Age: 38
End: 2023-09-25
Payer: COMMERCIAL

## 2023-09-26 ENCOUNTER — PATIENT MESSAGE (OUTPATIENT)
Dept: OBSTETRICS AND GYNECOLOGY | Facility: CLINIC | Age: 38
End: 2023-09-26
Payer: COMMERCIAL

## 2023-09-26 DIAGNOSIS — N30.90 CYSTITIS: ICD-10-CM

## 2023-09-26 RX ORDER — AMOXICILLIN AND CLAVULANATE POTASSIUM 875; 125 MG/1; MG/1
1 TABLET, FILM COATED ORAL EVERY 12 HOURS
Qty: 14 TABLET | Refills: 0 | Status: SHIPPED | OUTPATIENT
Start: 2023-09-26 | End: 2023-10-03

## 2023-10-03 ENCOUNTER — OFFICE VISIT (OUTPATIENT)
Dept: UROLOGY | Facility: CLINIC | Age: 38
End: 2023-10-03
Payer: COMMERCIAL

## 2023-10-03 VITALS
HEIGHT: 61 IN | WEIGHT: 137.81 LBS | HEART RATE: 58 BPM | RESPIRATION RATE: 16 BRPM | DIASTOLIC BLOOD PRESSURE: 90 MMHG | BODY MASS INDEX: 26.02 KG/M2 | TEMPERATURE: 99 F | SYSTOLIC BLOOD PRESSURE: 149 MMHG

## 2023-10-03 DIAGNOSIS — N30.90 CYSTITIS: ICD-10-CM

## 2023-10-03 PROBLEM — Z98.891 S/P PRIMARY LOW TRANSVERSE C-SECTION: Status: RESOLVED | Noted: 2023-09-13 | Resolved: 2023-10-03

## 2023-10-03 LAB
BILIRUB UR QL STRIP: NEGATIVE
GLUCOSE UR QL STRIP: NEGATIVE
KETONES UR QL STRIP: POSITIVE
LEUKOCYTE ESTERASE UR QL STRIP: POSITIVE
PH, POC UA: 6
POC BLOOD, URINE: POSITIVE
POC NITRATES, URINE: POSITIVE
POC RESIDUAL URINE VOLUME: 179 ML (ref 0–100)
PROT UR QL STRIP: POSITIVE
SP GR UR STRIP: >=1.03 (ref 1–1.03)
UROBILINOGEN UR STRIP-ACNC: 0.2 (ref 0.1–1.1)

## 2023-10-03 PROCEDURE — 3008F PR BODY MASS INDEX (BMI) DOCUMENTED: ICD-10-PCS | Mod: CPTII,S$GLB,, | Performed by: NURSE PRACTITIONER

## 2023-10-03 PROCEDURE — 51798 US URINE CAPACITY MEASURE: CPT | Mod: S$GLB,,, | Performed by: NURSE PRACTITIONER

## 2023-10-03 PROCEDURE — 99999 PR PBB SHADOW E&M-EST. PATIENT-LVL IV: CPT | Mod: PBBFAC,,, | Performed by: NURSE PRACTITIONER

## 2023-10-03 PROCEDURE — 3080F DIAST BP >= 90 MM HG: CPT | Mod: CPTII,S$GLB,, | Performed by: NURSE PRACTITIONER

## 2023-10-03 PROCEDURE — 99204 OFFICE O/P NEW MOD 45 MIN: CPT | Mod: S$GLB,,, | Performed by: NURSE PRACTITIONER

## 2023-10-03 PROCEDURE — 51798 POCT BLADDER SCAN: ICD-10-PCS | Mod: S$GLB,,, | Performed by: NURSE PRACTITIONER

## 2023-10-03 PROCEDURE — 3080F PR MOST RECENT DIASTOLIC BLOOD PRESSURE >= 90 MM HG: ICD-10-PCS | Mod: CPTII,S$GLB,, | Performed by: NURSE PRACTITIONER

## 2023-10-03 PROCEDURE — 1159F MED LIST DOCD IN RCRD: CPT | Mod: CPTII,S$GLB,, | Performed by: NURSE PRACTITIONER

## 2023-10-03 PROCEDURE — 99204 PR OFFICE/OUTPT VISIT, NEW, LEVL IV, 45-59 MIN: ICD-10-PCS | Mod: S$GLB,,, | Performed by: NURSE PRACTITIONER

## 2023-10-03 PROCEDURE — 99999 PR PBB SHADOW E&M-EST. PATIENT-LVL IV: ICD-10-PCS | Mod: PBBFAC,,, | Performed by: NURSE PRACTITIONER

## 2023-10-03 PROCEDURE — 81003 POCT URINALYSIS, DIPSTICK, AUTOMATED, W/O SCOPE: ICD-10-PCS | Mod: QW,S$GLB,, | Performed by: NURSE PRACTITIONER

## 2023-10-03 PROCEDURE — 1111F DSCHRG MED/CURRENT MED MERGE: CPT | Mod: CPTII,S$GLB,, | Performed by: NURSE PRACTITIONER

## 2023-10-03 PROCEDURE — 1159F PR MEDICATION LIST DOCUMENTED IN MEDICAL RECORD: ICD-10-PCS | Mod: CPTII,S$GLB,, | Performed by: NURSE PRACTITIONER

## 2023-10-03 PROCEDURE — 81003 URINALYSIS AUTO W/O SCOPE: CPT | Mod: QW,S$GLB,, | Performed by: NURSE PRACTITIONER

## 2023-10-03 PROCEDURE — 3077F PR MOST RECENT SYSTOLIC BLOOD PRESSURE >= 140 MM HG: ICD-10-PCS | Mod: CPTII,S$GLB,, | Performed by: NURSE PRACTITIONER

## 2023-10-03 PROCEDURE — 87086 URINE CULTURE/COLONY COUNT: CPT | Performed by: NURSE PRACTITIONER

## 2023-10-03 PROCEDURE — 3077F SYST BP >= 140 MM HG: CPT | Mod: CPTII,S$GLB,, | Performed by: NURSE PRACTITIONER

## 2023-10-03 PROCEDURE — 1111F PR DISCHARGE MEDS RECONCILED W/ CURRENT OUTPATIENT MED LIST: ICD-10-PCS | Mod: CPTII,S$GLB,, | Performed by: NURSE PRACTITIONER

## 2023-10-03 PROCEDURE — 3008F BODY MASS INDEX DOCD: CPT | Mod: CPTII,S$GLB,, | Performed by: NURSE PRACTITIONER

## 2023-10-03 NOTE — PROGRESS NOTES
Chief Complaint:   Urinary tract infection    HPI:   Patient is a 38-year-old female that is presenting postop .  Patient states that she had a Garces catheter during  and has had dysuria since being discharged from hospital.  Recent urine culture was contaminated, however, patient was prescribed Augmentin.  Denies flank pain or gross hematuria.  No history of renal stones.  Urine in clinic indicates nitrates and leukocytes, all other parameters are negative.  PVR is 179 mL.    Allergies:  Hay fever and allergy relief    Medications:  has a current medication list which includes the following prescription(s): prenatal vit103-iron fum-folic.    Review of Systems:  General: No fever, chills, fatigability, or weight loss.  Skin: No rashes, itching, or changes in color or texture of skin.  Chest: Denies JOHNSON, cyanosis, wheezing, cough, and sputum production.  Abdomen: Appetite fine. No weight loss. Denies diarrhea, abdominal pain, hematemesis, or blood in stool.  Admits to pelvic pain  Musculoskeletal: No joint stiffness or swelling. Denies back pain.  : As above.  All other review of systems negative.    PMH:   has a past medical history of Post-term pregnancy, 40-42 weeks of gestation (2023).    PSH:   has a past surgical history that includes Dental surgery and  section (N/A, 2023).    FamHx: family history is not on file.    SocHx:  reports that she has never smoked. She has never used smokeless tobacco. She reports that she does not currently use alcohol. She reports that she does not use drugs.      Physical Exam:  Vitals:    10/03/23 0941   BP: (!) 149/90   Pulse: (!) 58   Resp: 16   Temp: 98.8 °F (37.1 °C)     General: A&Ox3, no apparent distress, no deformities  Neck: No masses, normal thyroid  Lungs: normal inspiration, no use of accessory muscles  Heart: normal pulse, no arrhythmias  Abdomen: Soft, NT, ND, no masses, no hernias, no hepatosplenomegaly  Lymphatic: Neck and  groin nodes negative  Labs/Studies:   See HPI    Impression/Plan:   Urinary tract infection  Urine will be sent for culture and patient will be contacted with results and needed follow-up.  PVR slightly elevated, will need a return nurse visit for reassessment.  Patient is aware if urine culture is contaminated, see HPI, will proceed with cath urine sample.

## 2023-10-05 LAB — BACTERIA UR CULT: NO GROWTH

## 2023-10-09 ENCOUNTER — PATIENT MESSAGE (OUTPATIENT)
Dept: OBSTETRICS AND GYNECOLOGY | Facility: CLINIC | Age: 38
End: 2023-10-09
Payer: COMMERCIAL

## 2023-10-23 ENCOUNTER — POSTPARTUM VISIT (OUTPATIENT)
Dept: OBSTETRICS AND GYNECOLOGY | Facility: CLINIC | Age: 38
End: 2023-10-23
Payer: COMMERCIAL

## 2023-10-23 VITALS
SYSTOLIC BLOOD PRESSURE: 128 MMHG | BODY MASS INDEX: 25.23 KG/M2 | HEIGHT: 61 IN | WEIGHT: 133.63 LBS | DIASTOLIC BLOOD PRESSURE: 90 MMHG

## 2023-10-23 DIAGNOSIS — D21.9 FIBROIDS: ICD-10-CM

## 2023-10-23 DIAGNOSIS — Z98.891 S/P PRIMARY LOW TRANSVERSE C-SECTION: Primary | ICD-10-CM

## 2023-10-23 PROCEDURE — 99999 PR PBB SHADOW E&M-EST. PATIENT-LVL III: CPT | Mod: PBBFAC,,, | Performed by: OBSTETRICS & GYNECOLOGY

## 2023-10-23 PROCEDURE — 99999 PR PBB SHADOW E&M-EST. PATIENT-LVL III: ICD-10-PCS | Mod: PBBFAC,,, | Performed by: OBSTETRICS & GYNECOLOGY

## 2023-10-23 PROCEDURE — 0503F POSTPARTUM CARE VISIT: CPT | Mod: CPTII,S$GLB,, | Performed by: OBSTETRICS & GYNECOLOGY

## 2023-10-23 PROCEDURE — 0503F PR POSTPARTUM CARE VISIT: ICD-10-PCS | Mod: CPTII,S$GLB,, | Performed by: OBSTETRICS & GYNECOLOGY

## 2023-10-23 RX ORDER — ACETAMINOPHEN AND CODEINE PHOSPHATE 120; 12 MG/5ML; MG/5ML
1 SOLUTION ORAL DAILY
Qty: 28 TABLET | Refills: 6 | Status: SHIPPED | OUTPATIENT
Start: 2023-10-23 | End: 2024-10-22

## 2023-10-23 RX ORDER — ASPIRIN 81 MG/1
81 TABLET ORAL
COMMUNITY
Start: 2023-10-05 | End: 2023-11-22

## 2023-10-23 NOTE — PROGRESS NOTES
"CC: Post-partum follow-up    Betty Felipe is a 38 y.o. female  who presents for post-partum visit.  She is S/P a  PLTCS .  She and the baby are doing well.  No pain.  No fever.   No bowel / bladder complaints.    Delivery Date: 2023  Delivery MD: Cassy  Gender: male  Birth Weight: 8 pounds 7 ounces  Breast Feeding: YES  Depression: NO  Contraception: oral progesterone-only contraceptive (pt would like to take for fibroid control)    Pregnancy was complicated by:  Fibroids, transfer of care from [facility], GBS, Pyelectasis    BP (!) 128/90   Ht 5' 1" (1.549 m)   Wt 60.6 kg (133 lb 9.6 oz)   BMI 25.24 kg/m²     ROS:  GENERAL: No fever, chills, fatigability.  CARDIOVASCULAR: No chest pain. No shortness of breath. No leg cramps.  BREAST: Denies pain. No lumps. No discharge.  ABDOMEN: No abdominal pain. Denies nausea. Denies vomiting. No diarrhea. No constipation  URINARY: No incontinence, no nocturia, no frequency and no dysuria.  VULVAR: No pain, no lesions and no itching.  VAGINAL: No relaxation, no itching, no discharge, no abnormal bleeding and no lesions.  NEUROLOGICAL: No headaches. No vision changes.    PHYSICAL EXAM:  GENERAL: Alert and oriented in no distress  CHEST: Clear to auscultation  CV; Regular rate and rhythm  ABDOMEN:  Soft, non-tender, non-distended  WOUND: Healed well  PELVIC: pt declined  EXTREMITIES: Non-tender, Negative Eddie's    A/P:  S/P primary low transverse     Fibroids  -     norethindrone (MICRONOR) 0.35 mg tablet; Take 1 tablet (0.35 mg total) by mouth once daily.  Dispense: 28 tablet; Refill: 6      Follow up in about 6 months (around 2024) for Annual exam.    "

## 2023-11-06 NOTE — PROGRESS NOTES
[facility]   Pelvic Health Physical Therapy Initial Evaluation     Date: 2023   Name: Betty Felipe  Clinic Number: [id number]    Therapy Diagnosis:   Encounter Diagnoses   Name Primary?    Decreased strength of lower extremity     Decreased range of motion of both lower extremities      Physician: Maddy Carter CNM    Physician Orders: PT Eval and Treat   Medical Diagnosis from Referral: Pelvic pain [R10.2]  Evaluation Date: 2023  Authorization Period Expiration: 2024  Plan of Care Expiration: 2023  Progress Note Due: 2023  Visit # / Visits authorized:  eval   FOTO: Issued Visit #: 1 /3    Precautions: Standard    Time In: 12:47  Time Out: 1:45  Total Appointment Time (timed & untimed codes): 58 minutes    SUBJECTIVE     Date of onset:     History of current condition - Betty reports: currently being 8 weeks postpartum. She previously went to pelvic floor physical therapy however was not satisfied. She reports having a compressed nerve on the right side of her lower back and causes increased pain in the area. 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 She states that everything below the waist hurts. She reports being constipated immediately after pregnancy, however she thinks it is starting to regulate back to normal. She reports having a sharp pain when breast feeding and that this has slowly subsided.         OB/GYN History: , caesarean, painful periods, pelvic pain, and uterine fibroids. 3 UTIs during pregnancy. Discomfort at the beginning of urination  Sexually active? No  Pain with vaginal exams, intercourse or tampon use? Yes. At times would spot after penetration.     Bladder/Bowel History: nocturia, weak stream  Frequency of urination:   Daytime: 5-7           Nighttime: 1-2   Difficulty initiating urine stream: No.   Urine stream:  weak  Complete emptying: Yes  Bladder leakage: No  Frequency of incidents: none  Amount leaked (urine):  none  Urinary Urgency: Yes, Able to delay the urge for at least 30 minute(s).  Frequency of bowel movements: once a day  Difficulty initiating BM: Yes recently  Quality/Shape of BM: [place] Stool Chart Type 3  Does Patient Feel Empty after BM? Yes (this past week)  Fiber Supplements or Laxative Use? Yes (imodium last month). Will try to increase fiber this month.   Colon leakage: No  Frequency of incidents: none   Amount leaked (bowels):  none  Form of protection: none  Number of pads required in 24 hours: none    Pain:  Location: pelvic   Current /10, worst /10, this morning a 7/10  Description: Aching and Sharp  Aggravating Factors/Activities that cause symptoms: prolonged standing, driving, crossed legs in sitting, lying on side (both), perfers to sleep on L side.   Easing Factors: medication, pillows to relieve pressure, ointments    Imaging: see chart    Prior Therapy: yes  Social History: lives with their family  Current exercise: not much anymore   Occupation:teacher   Prior Level of Function: Pt was independent with all ADLs and iADLs without pain, no reports of incontinence of bowel or bladder.  Current Level of Function: Difficulty performing transfers as well as prolonged standing without pain    Types of fluid intake: water and coffee, soda, juices, protein drinks  Diet: regular    Abuse/Neglect: No     Patients goals: increase range of motion and to relieve consistent pain      Medical History: Betty  has a past medical history of Post-term pregnancy, 40-42 weeks of gestation (2023).     Surgical History: Betty Felipe  has a past surgical history that includes Dental surgery and  section (N/A, 2023).    Medications: Betty has a current medication list which includes the following prescription(s): aspirin, norethindrone, and prenatal vit103-iron fum-folic.    Allergies:    Review of patient's allergies indicates:   Allergen Reactions    Hay fever and allergy relief Itching and Shortness Of Breath        OBJECTIVE     Patient would like to defer intravaginal assessment at this time.     ORTHO SCREEN  Posture in sitting: slouched   Posture in standing: pain posturing evident  and forward and rounded shoulders   Pelvic alignment: no sign of deviations noted in supine   SI Joint Palpation: Tenderness to the right SI joint palpation. Tenderness to the left SI joint palpation.      ABDOMINAL WALL ASSESSMENT  Palpation: increased tension  and hypertonic  Abdominal strength: Transverse abdominus: poor  Scarring:  scar assessed. Fair mobility noted. Increased tenderness to palpation.   Diastasis: not assessed due to pain    BREATHING MECHANICS ASSESSMENT   Thorax Assessment During Quiet Respiration: Decreased excursion of abdominal wall   Thorax Assessment During Deep Respiration: Decreased excursion bilaterally of lateral ribs     AROM:    Thoracolumbar  (single inclinometer at L4/5) AROM  (%) Pain/Dysfunction with Movement   Flexion 50    Extension 50    Right side bending 30    Left side bending 50    Right rotation 75    Left rotation 75      Limited hip external rotation in supine bilaterally  Limited hamstring lengthening bilaterally.   Lumbar palpation in sidelying (patient unable to lay on to stomach): tender to palpation on lumbar spinous processes as well as sacrum  STS: increased trunk flexion, UE assist    Strength:     L/E MMT Right Left Pain/Dysfunction with Movement   Hip Flexion 3+/5 3+/5    Hip Abduction 3+/5 3+/5    Hip IR 4-/5 4-/5    Hip ER 3-/5 3-/5    Knee Flexion 4-/5 4-/5    Knee Extension 4-/5 4-/5    Ankle DF 4-/5 4-/5        Limitation/Restriction for FOTO Pelvic Floor Survey    Therapist reviewed FOTO scores for Betty Felipe on 2023.   FOTO documents entered into [email or web address] - see Media section.    Limitation Score: PFDI Pain 88       TREATMENT     Total  Treatment time (time-based codes) separate from Evaluation: 24 minutes     Neuromuscular Re-education to improve Coordination, Control, Down training, Proprioception, and Sense for 12 minutes including:     Diaphragmatic breathing  TA contractions  LTRs with deep breathing      Therapeutic Activity to improve dynamic functional performance for 12 minutes including:    Education on diaphragmatic breathing and importance of relaxation  Education provided on nursing ergonomics and to improve posture with breast feeding  Patient donned SI belt and reporting mild relief with sit to stand transfer.   Education on log rolling technique       PATIENT EDUCATION AND HOME EXERCISES     Education provided:   general anatomy/physiology of urinary/ bowel  system, benefits of treatment, risks of treatment, and alternative methods of treatment were discussed with the patient. Additionally, bladder irritants, anatomy/physiology of pelvic floor, posture/body mechanices, diaphragmatic breathing, isometric abdominal exercises, kegels, proper bearing down techniques, and fluid intake/dietary modifications were reviewed.     Written Home Exercises provided: yes.  Exercises were reviewed and Betty was able to demonstrate them prior to the end of the session. Betty demonstrated good  understanding of the education provided. See EMR under Patient Instructions for exercises provided during therapy sessions.    ASSESSMENT     Betty is a 38 y.o. female referred to outpatient Physical Therapy with a medical diagnosis of pelvic pain. Pt presents with altered posture, poor knowledge of body mechanics and posture, adhered abdominal scar, poor trunk stability, pelvic floor tenderness, decreased pelvic muscle strength, increased tension of the pelvic muscles, increased nocturia, and poor fluid intake. Decreased strength, endurance, and flexibility of bilateral lower extremities.       Patient prognosis is Good.   Patient will benefit from  skilled outpatient Physical Therapy to address the deficits stated above and in the chart below, provide patient/family education, and to maximize patient's level of independence.     Plan of care discussed with patient: Yes  Patient's spiritual, cultural and educational needs considered and patient is agreeable to the plan of care and goals as stated below:     Anticipated Barriers for therapy: see PMHx    Medical Necessity is demonstrated by the following:    History  Co-morbidities and personal factors that may impact the plan of care [x] LOW: no personal factors / co-morbidities  [] MODERATE: 1-2 personal factors / co-morbidities  [] HIGH: 3+ personal factors / co-morbidities    Personal Factors:   no deficits     Examination  Body Structures and Functions, activity limitations and participation restrictions that may impact the plan of care [] LOW: addressing 1-2 elements  [x] MODERATE: 3+ elements  [] HIGH: 4+ elements (please support below)    Moderate / High Support Documentation: altered posture, poor knowledge of body mechanics and posture, adhered abdominal scar, poor trunk stability, pelvic floor tenderness, decreased pelvic muscle strength, increased tension of the pelvic muscles, increased nocturia, and poor fluid intake     Clinical Presentation [] LOW: stable  [x] MODERATE: Evolving  [] HIGH: Unstable     Decision Making/ Complexity Score: moderate        Short Term Goals:  3 weeks  HEP: Patient will demonstrate 50% independence with HEP   Pain: Pt will demonstrate improved pain less than or equal to 5/10   Function: Patient will decrease functional limitation score to less than or equal to 70 on FOTO.  Mobility: Patient will improve by 50% in lumbar active range of motion to show improvements in lumbar mobility.   Strength: Patient will improve impaired strength to greater or equal to 4/5.   Patient to show appropriate diaphragmatic breathing techniques to help with calming the nervous system in  order to improve abdominal wall and PF musculature extensibility for decreased pain, improved voiding ability, and improved QOL.    Long Term Goals:  6 weeks  HEP: Pt will be independent with advanced HEP.  Pain: Pt will demonstrate improved pain less than or equal to 3/10 in order to progress toward maximal functional ability and improve QOL.  Function: Patient will decrease functional limitation score to less than or equal to 55 on FOTO to improve functional independence and quality of life.   Mobility: Patient will demonstrate full active lumbar range of motion without pain to return to prior level of function.   Strength: Patient will improve impaired strength to greater or equal to 4+/5 in order to perform transfers without UE support.   Gait: Patient will demonstrate normalized gait mechanics with minimal compensation in order to return to PLOF.  Patient will report ability to stand for 2 hours or longer with 3/10 pain or less to show improvements in standing tolerance.     PLAN     Plan of care Certification: 11/7/2023 to 12/19/2023.    Outpatient Physical Therapy 2 time(s) every week(s) for 6 weeks to include the following interventions: Electrical Stimulation TENS/IFC, Manual Therapy, Moist Heat/ Ice, Neuromuscular Re-ed, Patient Education, Self Care, Therapeutic Activities, and Therapeutic Exercise.     Nacho Corral, PT      I CERTIFY THE NEED FOR THESE SERVICES FURNISHED UNDER THIS PLAN OF TREATMENT AND WHILE UNDER MY CARE   Physician's comments:     Physician's Signature: ___________________________________________________

## 2023-11-07 ENCOUNTER — CLINICAL SUPPORT (OUTPATIENT)
Dept: REHABILITATION | Facility: HOSPITAL | Age: 38
End: 2023-11-07
Payer: COMMERCIAL

## 2023-11-07 DIAGNOSIS — R29.898 DECREASED STRENGTH OF LOWER EXTREMITY: ICD-10-CM

## 2023-11-07 DIAGNOSIS — M25.661 DECREASED RANGE OF MOTION OF BOTH LOWER EXTREMITIES: ICD-10-CM

## 2023-11-07 DIAGNOSIS — M25.662 DECREASED RANGE OF MOTION OF BOTH LOWER EXTREMITIES: ICD-10-CM

## 2023-11-07 PROCEDURE — 97530 THERAPEUTIC ACTIVITIES: CPT | Mod: PN

## 2023-11-07 PROCEDURE — 97112 NEUROMUSCULAR REEDUCATION: CPT | Mod: PN

## 2023-11-07 PROCEDURE — 97162 PT EVAL MOD COMPLEX 30 MIN: CPT | Mod: PN

## 2023-11-07 NOTE — PATIENT INSTRUCTIONS
NURSING ERGONOMICS  You may have heard of ergonomics for typing at a keyboard or for lifting, but there is also an ergonomic way to breastfeed. Positioning yourself and your baby for comfort and efficiency breast-feeding can reduce your risk for shoulder and upper/mid back discomfort. Nursing every few hours during the day can add up, and as your baby grows, it's important to make sure you are both in the proper alignment.    PROPER POSITIONING FOR NURSING  - Ensure all needs are within reach before starting to nurse  - Sit with feet on the ground, all the way back on a chir/sofa  - Keep your shoulders relaxed  - Avoid looking down at your baby the whole time  - Bring your baby to the nipple, not the other way around  - Prop the baby up with boppy, pillow, rolled towel so you don't have to strain your arms

## 2023-11-08 PROBLEM — M25.662 DECREASED RANGE OF MOTION OF BOTH LOWER EXTREMITIES: Status: ACTIVE | Noted: 2023-11-08

## 2023-11-08 PROBLEM — M25.661 DECREASED RANGE OF MOTION OF BOTH LOWER EXTREMITIES: Status: ACTIVE | Noted: 2023-11-08

## 2023-11-08 PROBLEM — R29.898 DECREASED STRENGTH OF LOWER EXTREMITY: Status: ACTIVE | Noted: 2023-11-08

## 2023-11-09 NOTE — PLAN OF CARE
[facility]   Pelvic Health Physical Therapy Initial Evaluation     Date: 2023   Name: Betty Felipe  Clinic Number: [id number]    Therapy Diagnosis:   Encounter Diagnoses   Name Primary?    Decreased strength of lower extremity     Decreased range of motion of both lower extremities      Physician: Maddy Carter CNM    Physician Orders: PT Eval and Treat   Medical Diagnosis from Referral: Pelvic pain [R10.2]  Evaluation Date: 2023  Authorization Period Expiration: 2024  Plan of Care Expiration: 2023  Progress Note Due: 2023  Visit # / Visits authorized:  eval   FOTO: Issued Visit #: 1 /3    Precautions: Standard    Time In: 12:47  Time Out: 1:45  Total Appointment Time (timed & untimed codes): 58 minutes    SUBJECTIVE     Date of onset:     History of current condition - Betty reports: currently being 8 weeks postpartum. She previously went to pelvic floor physical therapy however was not satisfied. She reports having a compressed nerve on the right side of her lower back and causes increased pain in the area. 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 She states that everything below the waist hurts. She reports being constipated immediately after pregnancy, however she thinks it is starting to regulate back to normal. She reports having a sharp pain when breast feeding and that this has slowly subsided.         OB/GYN History: , caesarean, painful periods, pelvic pain, and uterine fibroids. 3 UTIs during pregnancy. Discomfort at the beginning of urination  Sexually active? No  Pain with vaginal exams, intercourse or tampon use? Yes. At times would spot after penetration.     Bladder/Bowel History: nocturia, weak stream  Frequency of urination:   Daytime: 5-7           Nighttime: 1-2   Difficulty initiating urine stream: No.   Urine stream:  weak  Complete emptying: Yes  Bladder leakage: No  Frequency of incidents: none  Amount leaked (urine): none  Urinary Urgency: Yes, Able to delay the urge for at least 30 minute(s).  Frequency of bowel movements: once a day  Difficulty initiating BM: Yes recently  Quality/Shape of BM: [place] Stool Chart Type 3  Does Patient Feel Empty after BM? Yes (this past week)  Fiber Supplements or Laxative Use? Yes (imodium last month). Will try to increase fiber this month.   Colon leakage: No  Frequency of incidents: none   Amount leaked (bowels): none  Form of protection: none  Number of pads required in 24 hours: none    Pain:  Location: pelvic   Current /10, worst /10, this morning a 7/10  Description: Aching and Sharp  Aggravating Factors/Activities that cause symptoms: prolonged standing, driving, crossed legs in sitting, lying on side (both), perfers to sleep on L side.   Easing Factors: medication, pillows to relieve pressure, ointments    Imaging: see chart    Prior Therapy: yes  Social History: lives with their family  Current exercise: not much anymore   Occupation:teacher   Prior Level of Function: Pt was independent with all ADLs and iADLs without pain, no reports of incontinence of bowel or bladder.  Current Level of Function: Difficulty performing transfers as well as prolonged standing without pain    Types of fluid intake: water and coffee, soda, juices, protein drinks  Diet: regular    Abuse/Neglect: No     Patients goals: increase range of motion and to relieve consistent pain      Medical History: Betty  has a past medical history of Post-term pregnancy, 40-42 weeks of gestation (2023).     Surgical History: Betty Felipe  has a past surgical history that includes Dental surgery and  section (N/A, 2023).    Medications: Betty has a current medication list which includes the following prescription(s): aspirin, norethindrone, and prenatal vit103-iron fum-folic.    Allergies:   Review  of patient's allergies indicates:   Allergen Reactions    Hay fever and allergy relief Itching and Shortness Of Breath        OBJECTIVE     Patient would like to defer intravaginal assessment at this time.     ORTHO SCREEN  Posture in sitting: slouched   Posture in standing: pain posturing evident  and forward and rounded shoulders   Pelvic alignment: no sign of deviations noted in supine   SI Joint Palpation: Tenderness to the right SI joint palpation. Tenderness to the left SI joint palpation.      ABDOMINAL WALL ASSESSMENT  Palpation: increased tension  and hypertonic  Abdominal strength: Transverse abdominus: poor  Scarring:  scar assessed. Fair mobility noted. Increased tenderness to palpation.   Diastasis: not assessed due to pain    BREATHING MECHANICS ASSESSMENT   Thorax Assessment During Quiet Respiration: Decreased excursion of abdominal wall   Thorax Assessment During Deep Respiration: Decreased excursion bilaterally of lateral ribs     AROM:    Thoracolumbar  (single inclinometer at L4/5) AROM  (%) Pain/Dysfunction with Movement   Flexion 50    Extension 50    Right side bending 30    Left side bending 50    Right rotation 75    Left rotation 75      Limited hip external rotation in supine bilaterally  Limited hamstring lengthening bilaterally.   Lumbar palpation in sidelying (patient unable to lay on to stomach): tender to palpation on lumbar spinous processes as well as sacrum  STS: increased trunk flexion, UE assist    Strength:     L/E MMT Right Left Pain/Dysfunction with Movement   Hip Flexion 3+/5 3+/5    Hip Abduction 3+/5 3+/5    Hip IR 4-/5 4-/5    Hip ER 3-/5 3-/5    Knee Flexion 4-/5 4-/5    Knee Extension 4-/5 4-/5    Ankle DF 4-/5 4-/5        Limitation/Restriction for FOTO Pelvic Floor Survey    Therapist reviewed FOTO scores for Betty Felipe on 2023.   FOTO documents entered into [employer] - see Media section.    Limitation Score: PFDI Pain 88       TREATMENT     Total Treatment  time (time-based codes) separate from Evaluation: 24 minutes     Neuromuscular Re-education to improve Coordination, Control, Down training, Proprioception, and Sense for 12 minutes including:     Diaphragmatic breathing  TA contractions  LTRs with deep breathing      Therapeutic Activity to improve dynamic functional performance for 12 minutes including:    Education on diaphragmatic breathing and importance of relaxation  Education provided on nursing ergonomics and to improve posture with breast feeding  Patient donned SI belt and reporting mild relief with sit to stand transfer.   Education on log rolling technique       PATIENT EDUCATION AND HOME EXERCISES     Education provided:   general anatomy/physiology of urinary/ bowel  system, benefits of treatment, risks of treatment, and alternative methods of treatment were discussed with the patient. Additionally, bladder irritants, anatomy/physiology of pelvic floor, posture/body mechanices, diaphragmatic breathing, isometric abdominal exercises, kegels, proper bearing down techniques, and fluid intake/dietary modifications were reviewed.     Written Home Exercises provided: yes.  Exercises were reviewed and Betty was able to demonstrate them prior to the end of the session. Betty demonstrated good  understanding of the education provided. See EMR under Patient Instructions for exercises provided during therapy sessions.    ASSESSMENT     Betty is a 38 y.o. female referred to outpatient Physical Therapy with a medical diagnosis of pelvic pain. Pt presents with altered posture, poor knowledge of body mechanics and posture, adhered abdominal scar, poor trunk stability, pelvic floor tenderness, decreased pelvic muscle strength, increased tension of the pelvic muscles, increased nocturia, and poor fluid intake. Decreased strength, endurance, and flexibility of bilateral lower extremities.       Patient prognosis is Good.   Patient will benefit from skilled  outpatient Physical Therapy to address the deficits stated above and in the chart below, provide patient/family education, and to maximize patient's level of independence.     Plan of care discussed with patient: Yes  Patient's spiritual, cultural and educational needs considered and patient is agreeable to the plan of care and goals as stated below:     Anticipated Barriers for therapy: see PMHx    Medical Necessity is demonstrated by the following:    History  Co-morbidities and personal factors that may impact the plan of care [x] LOW: no personal factors / co-morbidities  [] MODERATE: 1-2 personal factors / co-morbidities  [] HIGH: 3+ personal factors / co-morbidities    Personal Factors:   no deficits     Examination  Body Structures and Functions, activity limitations and participation restrictions that may impact the plan of care [] LOW: addressing 1-2 elements  [x] MODERATE: 3+ elements  [] HIGH: 4+ elements (please support below)    Moderate / High Support Documentation: altered posture, poor knowledge of body mechanics and posture, adhered abdominal scar, poor trunk stability, pelvic floor tenderness, decreased pelvic muscle strength, increased tension of the pelvic muscles, increased nocturia, and poor fluid intake     Clinical Presentation [] LOW: stable  [x] MODERATE: Evolving  [] HIGH: Unstable     Decision Making/ Complexity Score: moderate        Short Term Goals:  3 weeks  HEP: Patient will demonstrate 50% independence with HEP   Pain: Pt will demonstrate improved pain less than or equal to 5/10   Function: Patient will decrease functional limitation score to less than or equal to 70 on FOTO.  Mobility: Patient will improve by 50% in lumbar active range of motion to show improvements in lumbar mobility.   Strength: Patient will improve impaired strength to greater or equal to 4/5.   Patient to show appropriate diaphragmatic breathing techniques to help with calming the nervous system in order to  improve abdominal wall and PF musculature extensibility for decreased pain, improved voiding ability, and improved QOL.    Long Term Goals:  6 weeks  HEP: Pt will be independent with advanced HEP.  Pain: Pt will demonstrate improved pain less than or equal to 3/10 in order to progress toward maximal functional ability and improve QOL.  Function: Patient will decrease functional limitation score to less than or equal to 55 on FOTO to improve functional independence and quality of life.   Mobility: Patient will demonstrate full active lumbar range of motion without pain to return to prior level of function.   Strength: Patient will improve impaired strength to greater or equal to 4+/5 in order to perform transfers without UE support.   Gait: Patient will demonstrate normalized gait mechanics with minimal compensation in order to return to PLOF.  Patient will report ability to stand for 2 hours or longer with 3/10 pain or less to show improvements in standing tolerance.     PLAN     Plan of care Certification: 11/7/2023 to 12/19/2023.    Outpatient Physical Therapy 2 time(s) every week(s) for 6 weeks to include the following interventions: Electrical Stimulation TENS/IFC, Manual Therapy, Moist Heat/ Ice, Neuromuscular Re-ed, Patient Education, Self Care, Therapeutic Activities, and Therapeutic Exercise.     Nacho Corral, PT      I CERTIFY THE NEED FOR THESE SERVICES FURNISHED UNDER THIS PLAN OF TREATMENT AND WHILE UNDER MY CARE   Physician's comments:     Physician's Signature: ___________________________________________________

## 2023-11-10 ENCOUNTER — PATIENT MESSAGE (OUTPATIENT)
Dept: OBSTETRICS AND GYNECOLOGY | Facility: CLINIC | Age: 38
End: 2023-11-10
Payer: COMMERCIAL

## 2023-11-22 ENCOUNTER — OFFICE VISIT (OUTPATIENT)
Dept: OBSTETRICS AND GYNECOLOGY | Facility: CLINIC | Age: 38
End: 2023-11-22
Payer: COMMERCIAL

## 2023-11-22 VITALS — HEIGHT: 61 IN | BODY MASS INDEX: 25.56 KG/M2 | WEIGHT: 135.38 LBS

## 2023-11-22 DIAGNOSIS — D21.9 FIBROIDS: Primary | ICD-10-CM

## 2023-11-22 PROCEDURE — 99213 OFFICE O/P EST LOW 20 MIN: CPT | Mod: S$GLB,,, | Performed by: OBSTETRICS & GYNECOLOGY

## 2023-11-22 PROCEDURE — 99213 PR OFFICE/OUTPT VISIT, EST, LEVL III, 20-29 MIN: ICD-10-PCS | Mod: S$GLB,,, | Performed by: OBSTETRICS & GYNECOLOGY

## 2023-11-22 PROCEDURE — 3008F PR BODY MASS INDEX (BMI) DOCUMENTED: ICD-10-PCS | Mod: CPTII,S$GLB,, | Performed by: OBSTETRICS & GYNECOLOGY

## 2023-11-22 PROCEDURE — 99999 PR PBB SHADOW E&M-EST. PATIENT-LVL III: ICD-10-PCS | Mod: PBBFAC,,, | Performed by: OBSTETRICS & GYNECOLOGY

## 2023-11-22 PROCEDURE — 1159F MED LIST DOCD IN RCRD: CPT | Mod: CPTII,S$GLB,, | Performed by: OBSTETRICS & GYNECOLOGY

## 2023-11-22 PROCEDURE — 1160F RVW MEDS BY RX/DR IN RCRD: CPT | Mod: CPTII,S$GLB,, | Performed by: OBSTETRICS & GYNECOLOGY

## 2023-11-22 PROCEDURE — 3008F BODY MASS INDEX DOCD: CPT | Mod: CPTII,S$GLB,, | Performed by: OBSTETRICS & GYNECOLOGY

## 2023-11-22 PROCEDURE — 1160F PR REVIEW ALL MEDS BY PRESCRIBER/CLIN PHARMACIST DOCUMENTED: ICD-10-PCS | Mod: CPTII,S$GLB,, | Performed by: OBSTETRICS & GYNECOLOGY

## 2023-11-22 PROCEDURE — 99999 PR PBB SHADOW E&M-EST. PATIENT-LVL III: CPT | Mod: PBBFAC,,, | Performed by: OBSTETRICS & GYNECOLOGY

## 2023-11-22 PROCEDURE — 1159F PR MEDICATION LIST DOCUMENTED IN MEDICAL RECORD: ICD-10-PCS | Mod: CPTII,S$GLB,, | Performed by: OBSTETRICS & GYNECOLOGY

## 2023-11-22 NOTE — PROGRESS NOTES
Subjective:      Patient ID: Betty Felipe is a 38 y.o. female.    Chief Complaint:  Fibroids      History of Present Illness  HPI  Pt is here to discuss fibroids.  Pt had fibroids that were noted by US during her pregnancy.  Reports that she was told she had 2 fibroids at  but that she only had 1 seen at [facility].  Sister had surgery to remove her fibroids and had hers evaluated with US and MRI.  Pt is concerned that something could be missed and requests follow up imaging.  Reports noting persistent pains in the LLQ since her surgery.     GYN & OB History  Patient's last menstrual period was 2023.   Date of Last Pap: No result found    OB History    Para Term  AB Living   1 1 1     1   SAB IAB Ectopic Multiple Live Births         0 1      # Outcome Date GA Lbr Soham/2nd Weight Sex Delivery Anes PTL Lv   1 Term 23 41w0d  3.82 kg (8 lb 6.8 oz) M CS-LTranv EPI, Spinal N JUANY      Complications: Fetal Intolerance, Failure to progress in labor       Review of Systems  Review of Systems   Constitutional:  Negative for activity change, appetite change, chills, fatigue, fever and unexpected weight change.   Respiratory:  Negative for shortness of breath.    Cardiovascular:  Negative for chest pain, palpitations and leg swelling.   Gastrointestinal:  Positive for abdominal pain. Negative for bloating, blood in stool, constipation, diarrhea, nausea and vomiting.   Genitourinary:  Positive for pelvic pain. Negative for dysmenorrhea, dyspareunia, dysuria, flank pain, frequency, genital sores, hematuria, menorrhagia, menstrual problem, urgency, vaginal bleeding, vaginal discharge, vaginal pain, urinary incontinence, postcoital bleeding, vaginal dryness and vaginal odor.   Musculoskeletal:  Negative for back pain.   Neurological:  Negative for syncope and headaches.          Objective:     Physical Exam:   Constitutional: She is oriented to person, place, and time. She appears well-developed and  well-nourished. No distress.               Genitourinary:    Genitourinary Comments: Exam deferred by Pt                 Neurological: She is alert and oriented to person, place, and time.     Psychiatric: She has a normal mood and affect. Her behavior is normal. Thought content normal.         Assessment:     1. Fibroids             Plan:     Fibroids  -     US Pelvis Comp with Transvag NON-OB (xpd; Future; Expected date: 11/22/2023  -     Pt was counseled on fibroids, including common signs and symptoms.  Reported symptoms of LLQ pain appear to be related to recent C/S incision but cannot fully rule out fibroid etiology.  Symptoms are moderate in nature and appear to be well tolerated.  Pt counseled on options and she requests US for follow up.    Follow up in about 3 months (around 2/22/2024) for Annual exam.

## 2023-11-27 NOTE — PROGRESS NOTES
"  Pelvic Health Physical Therapy   Treatment Note     Name: Betty [place]  Clinic Number: [id number]    Therapy Diagnosis:   Encounter Diagnoses   Name Primary?    Decreased strength of lower extremity Yes    Decreased range of motion of both lower extremities      Physician: Maddy Carter CNM    Visit Date: 11/28/2023    Physician Orders: PT Eval and Treat   Medical Diagnosis from Referral: Pelvic pain [R10.2]  Evaluation Date: 11/7/2023  Authorization Period Expiration: 08/17/2025  Plan of Care Expiration: 12/19/2023  Progress Note Due: 12/07/2023  Visit # / Visits authorized: 1/20 + eval   FOTO: Issued Visit #: 1 /3     Precautions: Standard ; 9 weeks postpartum    Time In: 12:52pm  Time Out: 1:33pm  Total Billable Time: 41 minutes      Subjective     Pt reports: was recently sick this past week. She was able to do exercises while lying on her back. Able to lie on side a little better as well. She has been having pain in the same area as she usually has lightening crotch. Same sharp pain that comes and goes. She pain started the middle of last week. She is unable to drive long distance due to pain. She notices that she has lower abdominal pain when sitting upright like in a sandoval.     She was compliant with home exercise program.  Response to previous treatment: achy  Functional change: no change     Pain: 5/10  Location: generalized    Objective     Objective Measures updated at progress report unless specified.     Treatment       Betty received the following manual therapy techniques: to develop flexibility, extensibility, and desensitization for 12 minutes including:     Abdominal soft tissue mobilization     Betty participated in neuromuscular re-education activities to develop Coordination, Control, Down training, Posture, Proprioception, and Sense for 29 minutes including    Diaphragmatic breathing x12  TA contractions x12 - cues for "shh" technique   LTRs with diaphragmatic breathing- 1 limb at a " time  Posterior pelvic tilt with bolster under knees and diaphragmatic breathing  SAQ with deep breathing x8 ea. - aching in stomach       Home Exercises Provided and Patient Education Provided     Education provided:   - bladder irritants, anatomy/physiology of pelvic floor, posture/body mechanices, diaphragmatic breathing, isometric abdominal exercises, kegels, perineal stretching/massage, proper bearing down techniques, fluid intake/dietary modifications, and behavior modifications  Discussed progression of plan of care with patient; educated pt in activity modification; reviewed HEP with pt. Pt demonstrated and verbalized understanding of all instruction and was provided with a handout of HEP (see Patient Instructions).      Written Home Exercises Provided: Patient instructed to cont prior HEP.  Exercises were reviewed and Betty was able to demonstrate them prior to the end of the session.  Betty demonstrated good  understanding of the education provided.     See EMR under Patient Instructions for exercises provided prior visit.    Assessment     Betty presents to therapy today with improved pain with transfers such as supine to sidelying and sit to stand.  Patient reporting increased abdominal tension and sensitivity. Gentle abdominal soft tissue mobilization performed to patient tolerance to address concern. Patient reporting good tolerance. Focused on gentle relaxation techniques as well as light lower extremity strengthening. Continue progressing in POC as tolerated.   Betty Is somewhat progressing well towards her goals.   Pt prognosis is Good.     Pt will continue to benefit from skilled outpatient physical therapy to address the deficits listed in the problem list box on initial evaluation, provide pt/family education and to maximize pt's level of independence in the home and community environment.     Pt's spiritual, cultural and educational needs considered and pt agreeable to plan of care and  goals.     Anticipated barriers to physical therapy: see PMHx    Short Term Goals:  3 weeks  HEP: Patient will demonstrate 50% independence with HEP. Progressing  Pain: Pt will demonstrate improved pain less than or equal to 5/10. Progressing  Function: Patient will decrease functional limitation score to less than or equal to 70 on FOTO. Progressing  Mobility: Patient will improve by 50% in lumbar active range of motion to show improvements in lumbar mobility.  Progressing  Strength: Patient will improve impaired strength to greater or equal to 4/5. Progressing  Patient to show appropriate diaphragmatic breathing techniques to help with calming the nervous system in order to improve abdominal wall and PF musculature extensibility for decreased pain, improved voiding ability, and improved QOL. Progressing     Long Term Goals:  6 weeks  HEP: Pt will be independent with advanced HEP.  Pain: Pt will demonstrate improved pain less than or equal to 3/10 in order to progress toward maximal functional ability and improve QOL. Progressing  Function: Patient will decrease functional limitation score to less than or equal to 55 on FOTO to improve functional independence and quality of life. Progressing  Mobility: Patient will demonstrate full active lumbar range of motion without pain to return to prior level of function. Progressing  Strength: Patient will improve impaired strength to greater or equal to 4+/5 in order to perform transfers without UE support. Progressing  Gait: Patient will demonstrate normalized gait mechanics with minimal compensation in order to return to PLOF. Progressing  Patient will report ability to stand for 2 hours or longer with 3/10 pain or less to show improvements in standing tolerance. Progressing     PLAN      Plan of care Certification: 11/7/2023 to 12/19/2023.     Outpatient Physical Therapy 2 time(s) every week(s) for 6 weeks to include the following interventions: Electrical Stimulation  TENS/IFC, Manual Therapy, Moist Heat/ Ice, Neuromuscular Re-ed, Patient Education, Self Care, Therapeutic Activities, and Therapeutic Exercise.        Nacho Corral, PT

## 2023-11-28 ENCOUNTER — CLINICAL SUPPORT (OUTPATIENT)
Dept: REHABILITATION | Facility: HOSPITAL | Age: 38
End: 2023-11-28
Payer: COMMERCIAL

## 2023-11-28 DIAGNOSIS — M25.662 DECREASED RANGE OF MOTION OF BOTH LOWER EXTREMITIES: ICD-10-CM

## 2023-11-28 DIAGNOSIS — M25.661 DECREASED RANGE OF MOTION OF BOTH LOWER EXTREMITIES: ICD-10-CM

## 2023-11-28 DIAGNOSIS — R29.898 DECREASED STRENGTH OF LOWER EXTREMITY: Primary | ICD-10-CM

## 2023-11-28 PROCEDURE — 97112 NEUROMUSCULAR REEDUCATION: CPT | Mod: PN

## 2023-11-28 PROCEDURE — 97140 MANUAL THERAPY 1/> REGIONS: CPT | Mod: PN

## 2023-12-12 ENCOUNTER — CLINICAL SUPPORT (OUTPATIENT)
Dept: REHABILITATION | Facility: HOSPITAL | Age: 38
End: 2023-12-12

## 2023-12-12 DIAGNOSIS — R29.898 DECREASED STRENGTH OF LOWER EXTREMITY: Primary | ICD-10-CM

## 2023-12-12 DIAGNOSIS — M25.661 DECREASED RANGE OF MOTION OF BOTH LOWER EXTREMITIES: ICD-10-CM

## 2023-12-12 DIAGNOSIS — M25.662 DECREASED RANGE OF MOTION OF BOTH LOWER EXTREMITIES: ICD-10-CM

## 2023-12-12 PROCEDURE — 97112 NEUROMUSCULAR REEDUCATION: CPT | Mod: PN

## 2023-12-12 PROCEDURE — 97010 HOT OR COLD PACKS THERAPY: CPT | Mod: PN

## 2023-12-12 NOTE — PROGRESS NOTES
"  Pelvic Health Physical Therapy   Treatment Note     Name: Betty [place]  Clinic Number: [id number]    Therapy Diagnosis:   Encounter Diagnoses   Name Primary?    Decreased strength of lower extremity Yes    Decreased range of motion of both lower extremities        Physician: Maddy Carter CNM    Visit Date: 12/12/2023    Physician Orders: PT Eval and Treat   Medical Diagnosis from Referral: Pelvic pain [R10.2]  Evaluation Date: 11/7/2023  Authorization Period Expiration: 08/17/2025  Plan of Care Expiration: 12/19/2023 NEXT VISIT  Progress Note Due: 12/07/2023 TODAY  Visit # / Visits authorized: 2/20 + eval   FOTO: Issued Visit #: 1 /3     Precautions: Standard ; 11 weeks postpartum     Time In: 1:40pm  Time Out: 2:25pm  Total Billable Time: 45 minutes      Subjective     Pt reports: cycles started recently and she has been having an increase in pain. Increased stiffness due to cold weather. L knee stiffness and pain lingering throughout the day especially in the morning when getting out of bed.     She was not compliant with home exercise program.  Response to previous treatment: soreness for a week  Functional change: no change     Pain: 8.5/10   Location: generalized    Objective     Objective Measures updated at progress report unless specified.     BREATHING MECHANICS ASSESSMENT   Thorax Assessment During Deep Respiration: WNL. Better excursion bilaterally of lateral ribs     Treatment       Betty received the following manual therapy techniques: to develop flexibility, extensibility, and desensitization for 00 minutes including:     Abdominal soft tissue mobilization     Betty participated in neuromuscular re-education activities to develop Coordination, Control, Down training, Posture, Proprioception, and Sense for 34 minutes including    Diaphragmatic breathing x12  TA contractions x12 - cues for "shh" technique   LTRs with diaphragmatic breathing- 1 limb at a time  Posterior pelvic tilt with bolster " under knees and diaphragmatic breathing  SAQ with deep breathing x8 ea.   Supine shoulder flexion with dowel  Supine shoulder rainbows - increased pain in R shoulder    Hot pack 10 minutes to low back in supine.     Home Exercises Provided and Patient Education Provided     Education provided:   - bladder irritants, anatomy/physiology of pelvic floor, posture/body mechanices, diaphragmatic breathing, isometric abdominal exercises, kegels, perineal stretching/massage, proper bearing down techniques, fluid intake/dietary modifications, and behavior modifications  Discussed progression of plan of care with patient; educated pt in activity modification; reviewed HEP with pt. Pt demonstrated and verbalized understanding of all instruction and was provided with a handout of HEP (see Patient Instructions).      Written Home Exercises Provided: Patient instructed to cont prior HEP.  Exercises were reviewed and Betty was able to demonstrate them prior to the end of the session.  Betty demonstrated good  understanding of the education provided.     See EMR under Patient Instructions for exercises provided prior visit.    Assessment     Betty presents to therapy today with increased pain throughout body especially in low back, L knee, and between her shoulder blades. Started with diaphragmatic breathing to calm nervous system and improve relaxation. Educated patient on importance of posture awareness when sitting with baby in arms to decrease strain on upper back. Performed quad engagement interventions to improve knee stability. Continue progressing in POC as tolerated.     Betty Is somewhat progressing well towards her goals.   Pt prognosis is Good.     Pt will continue to benefit from skilled outpatient physical therapy to address the deficits listed in the problem list box on initial evaluation, provide pt/family education and to maximize pt's level of independence in the home and community environment.     Pt's  spiritual, cultural and educational needs considered and pt agreeable to plan of care and goals.     Anticipated barriers to physical therapy: see PMHx    Short Term Goals:  3 weeks  HEP: Patient will demonstrate 50% independence with HEP. Progressing  Pain: Pt will demonstrate improved pain less than or equal to 5/10. Progressing  Function: Patient will decrease functional limitation score to less than or equal to 70 on FOTO. Progressing  Mobility: Patient will improve by 50% in lumbar active range of motion to show improvements in lumbar mobility.  Progressing  Strength: Patient will improve impaired strength to greater or equal to 4/5. Progressing  Patient to show appropriate diaphragmatic breathing techniques to help with calming the nervous system in order to improve abdominal wall and PF musculature extensibility for decreased pain, improved voiding ability, and improved QOL. Progressing     Long Term Goals:  6 weeks  HEP: Pt will be independent with advanced HEP.  Pain: Pt will demonstrate improved pain less than or equal to 3/10 in order to progress toward maximal functional ability and improve QOL. Progressing  Function: Patient will decrease functional limitation score to less than or equal to 55 on FOTO to improve functional independence and quality of life. Progressing  Mobility: Patient will demonstrate full active lumbar range of motion without pain to return to prior level of function. Progressing  Strength: Patient will improve impaired strength to greater or equal to 4+/5 in order to perform transfers without UE support. Progressing  Gait: Patient will demonstrate normalized gait mechanics with minimal compensation in order to return to PLOF. Progressing  Patient will report ability to stand for 2 hours or longer with 3/10 pain or less to show improvements in standing tolerance. Progressing     PLAN      Plan of care Certification: 11/7/2023 to 12/19/2023.     Outpatient Physical Therapy 2 time(s)  every week(s) for 6 weeks to include the following interventions: Electrical Stimulation TENS/IFC, Manual Therapy, Moist Heat/ Ice, Neuromuscular Re-ed, Patient Education, Self Care, Therapeutic Activities, and Therapeutic Exercise.        Nacho Corral, PT

## 2023-12-12 NOTE — PATIENT INSTRUCTIONS
NURSING ERGONOMICS  You may have heard of ergonomics for typing at a keyboard or for lifting, but there is also an ergonomic way to breastfeed. Positioning yourself and your baby for comfort and efficiency breast-feeding can reduce your risk for shoulder and upper/mid back discomfort. Nursing every few hours during the day can add up, and as your baby grows, it's important to make sure you are both in the proper alignment.    PROPER POSITIONING FOR NURSING  - Ensure all needs are within reach before starting to nurse  - Sit with feet on the ground, all the way back on a chir/sofa  - Keep your shoulders relaxed  - Avoid looking down at your baby the whole time  - Bring your baby to the nipple, not the other way around  - Prop the baby up with boppy, pillow, rolled towel so you don't have to strain your arms          EXERCISES FOR MID-BACK DISCOMFORT  *If any of these exercises feels uncomfortable or you are unsure of your technique, please talk to your therapist. If you experience more than mild discomfort, you may need to be evaluated by a physical therapist with orthopedic training.    Open Book  - Lie on your side with knees bent  - Rotate the upper arm away, letting your upper body and head follow  *Can perform with a band        Serratus slide  - Stand facing a wall, with chest lifted and shoulder blades squeezed behind you  - Slide the pinky-side of your hands up the wall, with a band around the wrists (can also use pillowcase around the forearms          Shoulder external rotation  - Sit or stand with chest lifted, looking straight ahead  - Keeping the elbows pinned to your sides, open the arms while squeezing your shoulder blades together        Shoulder horizontal abduction (T)  - Sit or stand with upright posture (can also perform lying down)  - With arms at shoulder-height, pull the band apart while squeezing your shoulder blades together        Cat/cow  - Exhale to engage the core as your round your back  up towards the ceiling, let your head hang  - Inhale as you stretch your belly down to the ground and lift the head up  *Don't hold your breath

## 2023-12-13 ENCOUNTER — HOSPITAL ENCOUNTER (OUTPATIENT)
Dept: RADIOLOGY | Facility: HOSPITAL | Age: 38
Discharge: HOME | End: 2023-12-13
Attending: OBSTETRICS & GYNECOLOGY
Payer: COMMERCIAL

## 2023-12-13 DIAGNOSIS — D21.9 FIBROIDS: ICD-10-CM

## 2023-12-13 PROCEDURE — 76830 US PELVIS COMP WITH TRANSVAG NON-OB (XPD): ICD-10-PCS | Mod: 26,,, | Performed by: RADIOLOGY

## 2023-12-13 PROCEDURE — 76856 US EXAM PELVIC COMPLETE: CPT | Mod: 26,,, | Performed by: RADIOLOGY

## 2023-12-13 PROCEDURE — 76830 TRANSVAGINAL US NON-OB: CPT | Mod: TC

## 2023-12-13 PROCEDURE — 76830 TRANSVAGINAL US NON-OB: CPT | Mod: 26,,, | Performed by: RADIOLOGY

## 2023-12-13 PROCEDURE — 76856 US PELVIS COMP WITH TRANSVAG NON-OB (XPD): ICD-10-PCS | Mod: 26,,, | Performed by: RADIOLOGY

## 2023-12-19 ENCOUNTER — CLINICAL SUPPORT (OUTPATIENT)
Dept: REHABILITATION | Facility: HOSPITAL | Age: 38
End: 2023-12-19

## 2023-12-19 DIAGNOSIS — R29.898 DECREASED STRENGTH OF LOWER EXTREMITY: Primary | ICD-10-CM

## 2023-12-19 DIAGNOSIS — M25.662 DECREASED RANGE OF MOTION OF BOTH LOWER EXTREMITIES: ICD-10-CM

## 2023-12-19 DIAGNOSIS — M25.661 DECREASED RANGE OF MOTION OF BOTH LOWER EXTREMITIES: ICD-10-CM

## 2023-12-19 PROCEDURE — 97140 MANUAL THERAPY 1/> REGIONS: CPT | Mod: PN

## 2023-12-19 PROCEDURE — 97112 NEUROMUSCULAR REEDUCATION: CPT | Mod: PN

## 2023-12-19 NOTE — PROGRESS NOTES
"  Pelvic Health Physical Therapy   Updated Plan of Care Note     Name: Betty Fel[place]  Clinic Number: [id number]    Therapy Diagnosis:   Encounter Diagnoses   Name Primary?    Decreased strength of lower extremity Yes    Decreased range of motion of both lower extremities        Physician: Maddy Carter CNM    Visit Date: 2023    Physician Orders: PT Eval and Treat   Medical Diagnosis from Referral: Pelvic pain [R10.2]  Evaluation Date: 2023  Authorization Period Expiration: 2025  UPDATED Plan of Care Expiration: 2024  Progress Note Due: 2024  Visit # / Visits authorized: 3/20 + eval   FOTO: Issued Visit #: 2 /3         Precautions: Standard ; 13 weeks postpartum     Time In: 1:50pm  Time Out: 4:40pm  Total Billable Time: 40 minutes + 10 minutes heat      Subjective     Pt reports: low back pain primarily on the right side. Soreness near  with sharp pain when she scrunches too much or carrying her son.  She was somewhat compliant with home exercise program.  Response to previous treatment: soreness for a week  Functional change: no change     Pain: 7/10   Location: generalized    Objective         ABDOMINAL WALL ASSESSMENT  Palpation: increased tension  and hypertonic  Abdominal strength: Transverse abdominus: poor  Scarring:  scar assessed. Fair mobility noted. Improvement noticed in tenderness and sensitivity to palpation       BREATHING MECHANICS ASSESSMENT   Thorax Assessment During Deep Respiration: WNL    AROM:     Thoracolumbar  (single inclinometer at L4/5) AROM  (%) Pain/Dysfunction with Movement   Flexion 75  improved from 50%   Extension WNL  "good pain" in low back   Right side bending To knee joint     Left side bending To knee joint     Right rotation WNL     Left rotation WNL           Strength:                                  L/E MMT Right Left Pain/Dysfunction with Movement   Hip Flexion 4-/5 3+/5  RLE improved   Hip Abduction 4-/5 4-/5  BLE improved " "  Knee Flexion 4/5 4/5  BLE improved   Knee Extension 4-/5 4-/5          Treatment       Betty received the following manual therapy techniques: to develop flexibility, extensibility, and desensitization for 12 minutes including:     Scar tissue mobilization     Betty participated in neuromuscular re-education activities to develop Coordination, Control, Down training, Posture, Proprioception, and Sense for 28 minutes including    Diaphragmatic breathing x10  Seated swissball roll outs   Posterior pelvic tilt with bolster under knees and diaphragmatic breathing    Not today:  TA contractions x12 - cues for "shh" technique   LTRs with diaphragmatic breathing- 1 limb at a time  SAQ with deep breathing x8 ea.   Supine shoulder flexion with dowel  Supine shoulder rainbows - increased pain in R shoulder    Hot pack 10 minutes to low back in supine.     Home Exercises Provided and Patient Education Provided     Education provided:   - bladder irritants, anatomy/physiology of pelvic floor, posture/body mechanices, diaphragmatic breathing, isometric abdominal exercises, kegels, perineal stretching/massage, proper bearing down techniques, fluid intake/dietary modifications, and behavior modifications  Discussed progression of plan of care with patient; educated pt in activity modification; reviewed HEP with pt. Pt demonstrated and verbalized understanding of all instruction and was provided with a handout of HEP (see Patient Instructions).      Written Home Exercises Provided: Patient instructed to cont prior HEP.  Exercises were reviewed and Betty was able to demonstrate them prior to the end of the session.  Betty demonstrated good  understanding of the education provided.     See EMR under Patient Instructions for exercises provided prior visit.    Assessment     Betty tolerated therapy session well with continued complaints of pain in low back as well as vaginal pain with breast feeding. Lumbar active range of " motion and lower extremity strength has improved since initial evaluation, however, would like patient to continue skilled physical therapy to improve scar mobility, strength, and functional movement.     Betty Is somewhat progressing well towards her goals.   Pt prognosis is Good.     Pt will continue to benefit from skilled outpatient physical therapy to address the deficits listed in the problem list box on initial evaluation, provide pt/family education and to maximize pt's level of independence in the home and community environment.     Pt's spiritual, cultural and educational needs considered and pt agreeable to plan of care and goals.     Anticipated barriers to physical therapy: see PMHx    Short Term Goals:  3 weeks  HEP: Patient will demonstrate 50% independence with HEP. Progressing  Pain: Pt will demonstrate improved pain less than or equal to 5/10. Progressing  Function: Patient will decrease functional limitation score to less than or equal to 70 on FOTO. Progressing  Mobility: Patient will improve by 50% in lumbar active range of motion to show improvements in lumbar mobility.  Progressing  Strength: Patient will improve impaired strength to greater or equal to 4/5. Progressing  Patient to show appropriate diaphragmatic breathing techniques to help with calming the nervous system in order to improve abdominal wall and PF musculature extensibility for decreased pain, improved voiding ability, and improved QOL. MET 12/19/2023     Long Term Goals:  6 weeks  HEP: Pt will be independent with advanced HEP.  Pain: Pt will demonstrate improved pain less than or equal to 3/10 in order to progress toward maximal functional ability and improve QOL. Progressing  Function: Patient will decrease functional limitation score to less than or equal to 55 on FOTO to improve functional independence and quality of life. Progressing  Mobility: Patient will demonstrate full active lumbar range of motion without pain to  return to prior level of function. Progressing  Strength: Patient will improve impaired strength to greater or equal to 4+/5 in order to perform transfers without UE support. Progressing  Gait: Patient will demonstrate normalized gait mechanics with minimal compensation in order to return to PLOF. Progressing  Patient will report ability to stand for 2 hours or longer with 3/10 pain or less to show improvements in standing tolerance. Progressing     PLAN      Plan of care Certification: 12/19/2023 01/30/2024.     Outpatient Physical Therapy 2 time(s) every week(s) for 6 more weeks to include the following interventions: Electrical Stimulation TENS/IFC, Manual Therapy, Moist Heat/ Ice, Neuromuscular Re-ed, Patient Education, Self Care, Therapeutic Activities, and Therapeutic Exercise.        Nacho Corral, PT

## 2023-12-19 NOTE — PLAN OF CARE
"  Pelvic Health Physical Therapy   Updated Plan of Care Note     Name: Betty Felipe  Clinic Number: [id number]    Therapy Diagnosis:   Encounter Diagnoses   Name Primary?    Decreased strength of lower extremity Yes    Decreased range of motion of both lower extremities        Physician: Maddy Carter CNM    Visit Date: 2023    Physician Orders: PT Eval and Treat   Medical Diagnosis from Referral: Pelvic pain [R10.2]  Evaluation Date: 2023  Authorization Period Expiration: 2025  UPDATED Plan of Care Expiration: 2024  Progress Note Due: 2024  Visit # / Visits authorized: 3/20 + eval   FOTO: Issued Visit #: 2 /3         Precautions: Standard ; 13 weeks postpartum     Time In: 1:50pm  Time Out: 4:40pm  Total Billable Time: 40 minutes + 10 minutes heat      Subjective     Pt reports: low back pain primarily on the right side. Soreness near  with sharp pain when she scrunches too much or carrying her son.  She was somewhat compliant with home exercise program.  Response to previous treatment: soreness for a week  Functional change: no change     Pain: 7/10   Location: generalized    Objective         ABDOMINAL WALL ASSESSMENT  Palpation: increased tension  and hypertonic  Abdominal strength: Transverse abdominus: poor  Scarring:  scar assessed. Fair mobility noted. Improvement noticed in tenderness and sensitivity to palpation       BREATHING MECHANICS ASSESSMENT   Thorax Assessment During Deep Respiration: WNL    AROM:     Thoracolumbar  (single inclinometer at L4/5) AROM  (%) Pain/Dysfunction with Movement   Flexion 75  improved from 50%   Extension WNL  "good pain" in low back   Right side bending To knee joint     Left side bending To knee joint     Right rotation WNL     Left rotation WNL           Strength:                                  L/E MMT Right Left Pain/Dysfunction with Movement   Hip Flexion 4-/5 3+/5  RLE improved   Hip Abduction 4-/5 4-/5  BLE improved "   Knee Flexion 4/5 4/5  BLE improved   Knee Extension 4-/5 4-/5          Home Exercises Provided and Patient Education Provided     Education provided:   - bladder irritants, anatomy/physiology of pelvic floor, posture/body mechanices, diaphragmatic breathing, isometric abdominal exercises, kegels, perineal stretching/massage, proper bearing down techniques, fluid intake/dietary modifications, and behavior modifications  Discussed progression of plan of care with patient; educated pt in activity modification; reviewed HEP with pt. Pt demonstrated and verbalized understanding of all instruction and was provided with a handout of HEP (see Patient Instructions).      Written Home Exercises Provided: Patient instructed to cont prior HEP.  Exercises were reviewed and Betty was able to demonstrate them prior to the end of the session.  Betty demonstrated good  understanding of the education provided.     See EMR under Patient Instructions for exercises provided prior visit.    Assessment     Betty tolerated therapy session well with continued complaints of pain in low back as well as vaginal pain with breast feeding. Lumbar active range of motion and lower extremity strength has improved since initial evaluation, however, would like patient to continue skilled physical therapy to improve scar mobility, strength, and functional movement.     Betty Is somewhat progressing well towards her goals.   Pt prognosis is Good.     Pt will continue to benefit from skilled outpatient physical therapy to address the deficits listed in the problem list box on initial evaluation, provide pt/family education and to maximize pt's level of independence in the home and community environment.     Pt's spiritual, cultural and educational needs considered and pt agreeable to plan of care and goals.     Anticipated barriers to physical therapy: see PMHx    Short Term Goals:  3 weeks  HEP: Patient will demonstrate 50% independence with  HEP. Progressing  Pain: Pt will demonstrate improved pain less than or equal to 5/10. Progressing  Function: Patient will decrease functional limitation score to less than or equal to 70 on FOTO. Progressing  Mobility: Patient will improve by 50% in lumbar active range of motion to show improvements in lumbar mobility.  Progressing  Strength: Patient will improve impaired strength to greater or equal to 4/5. Progressing  Patient to show appropriate diaphragmatic breathing techniques to help with calming the nervous system in order to improve abdominal wall and PF musculature extensibility for decreased pain, improved voiding ability, and improved QOL. MET 12/19/2023     Long Term Goals:  6 weeks  HEP: Pt will be independent with advanced HEP.  Pain: Pt will demonstrate improved pain less than or equal to 3/10 in order to progress toward maximal functional ability and improve QOL. Progressing  Function: Patient will decrease functional limitation score to less than or equal to 55 on FOTO to improve functional independence and quality of life. Progressing  Mobility: Patient will demonstrate full active lumbar range of motion without pain to return to prior level of function. Progressing  Strength: Patient will improve impaired strength to greater or equal to 4+/5 in order to perform transfers without UE support. Progressing  Gait: Patient will demonstrate normalized gait mechanics with minimal compensation in order to return to PLOF. Progressing  Patient will report ability to stand for 2 hours or longer with 3/10 pain or less to show improvements in standing tolerance. Progressing     PLAN      Plan of care Certification: 12/19/2023 01/30/2024.     Outpatient Physical Therapy 2 time(s) every week(s) for 6 more weeks to include the following interventions: Electrical Stimulation TENS/IFC, Manual Therapy, Moist Heat/ Ice, Neuromuscular Re-ed, Patient Education, Self Care, Therapeutic Activities, and Therapeutic  Exercise.        Nacho Corral, PT

## 2024-02-15 ENCOUNTER — TELEPHONE (OUTPATIENT)
Dept: DERMATOLOGY | Facility: CLINIC | Age: 39
End: 2024-02-15
Payer: MEDICAID

## 2024-02-15 NOTE — TELEPHONE ENCOUNTER
Called patient regarding insurance. Patient informed we are not accepting new patient medicaid. Patient offered and declined billing office number. Patient verbalized understanding.       ----- Message from Naz Felipe sent at 2/15/2024  3:13 PM CST -----  Contact: Betty  Patient is calling office to inquire about insurance. Please call back  [phone]

## 2024-03-20 ENCOUNTER — TELEPHONE (OUTPATIENT)
Dept: OPHTHALMOLOGY | Facility: CLINIC | Age: 39
End: 2024-03-20
Payer: MEDICAID

## 2024-03-20 NOTE — TELEPHONE ENCOUNTER
-After speaking with patient she wanted to make sure could get glasses here and what's the cost of the visit. We are not contracted for glasses under her insurance as far as the visit. I would have her speak Christopher.    [facility]    ---- Message from Kiki Smith sent at 3/20/2024  7:54 AM CDT -----  Patient is requesting a call back regarding vision insurance. Call back number is .[phone]. Thx.EL

## 2024-04-15 ENCOUNTER — TELEPHONE (OUTPATIENT)
Dept: INTERNAL MEDICINE | Facility: CLINIC | Age: 39
End: 2024-04-15
Payer: MEDICAID

## 2024-04-15 NOTE — TELEPHONE ENCOUNTER
----- Message from Saúl Em sent at 4/15/2024  1:52 PM CDT -----  .Type:  Sooner Apoointment Request    Caller is requesting a sooner appointment.  Caller declined first available appointment listed below.  Caller will not accept being placed on the waitlist and is requesting a message be sent to doctor.  Name of Caller:pt  When is the first available appointment?6/20  Symptoms:est care and medication refill  Would the patient rather a call back or a response via [facility]? Call back  Best Call Back Number:[phone]  Additional Information:

## 2024-04-16 ENCOUNTER — TELEPHONE (OUTPATIENT)
Dept: INTERNAL MEDICINE | Facility: CLINIC | Age: 39
End: 2024-04-16
Payer: MEDICAID

## 2024-04-16 NOTE — TELEPHONE ENCOUNTER
----- Message from Yosi Perez sent at 4/16/2024  3:01 PM CDT -----  Contact: Betty Hernández would like a call back at [phone] in regards to needing to reschedule her virtual appointment that's for tomorrow 4/17/24 at 12pm. I was unable to pull up schedule.  Thanks   Am

## 2024-04-17 ENCOUNTER — TELEPHONE (OUTPATIENT)
Dept: INTERNAL MEDICINE | Facility: CLINIC | Age: 39
End: 2024-04-17
Payer: MEDICAID

## 2024-04-17 NOTE — TELEPHONE ENCOUNTER
----- Message from Lisa Allison sent at 4/17/2024  8:48 AM CDT -----  Regarding: soon appt  Contact: Betty  .Type:  Sooner Apoointment Request    Caller is requesting a sooner appointment.  Caller declined first available appointment listed below.  Caller will not accept being placed on the waitlist and is requesting a message be sent to doctor.  Name of Caller: Betty   When is the first available appointment?  Symptoms:  Would the patient rather a call back or a response via [facility]? call     Best Call Back Number: [phone] (home)    Additional Information: Betty is requesting a callback to scheduled a virtual visit and Epic did not have one.

## 2024-04-19 ENCOUNTER — PATIENT MESSAGE (OUTPATIENT)
Dept: RESEARCH | Facility: HOSPITAL | Age: 39
End: 2024-04-19
Payer: MEDICAID

## 2024-04-22 ENCOUNTER — TELEPHONE (OUTPATIENT)
Dept: INTERNAL MEDICINE | Facility: CLINIC | Age: 39
End: 2024-04-22
Payer: MEDICAID

## 2024-04-22 ENCOUNTER — PATIENT MESSAGE (OUTPATIENT)
Dept: INTERNAL MEDICINE | Facility: CLINIC | Age: 39
End: 2024-04-22

## 2024-04-22 ENCOUNTER — OFFICE VISIT (OUTPATIENT)
Dept: INTERNAL MEDICINE | Facility: CLINIC | Age: 39
End: 2024-04-22
Payer: MEDICAID

## 2024-04-22 VITALS
HEIGHT: 61 IN | BODY MASS INDEX: 24.52 KG/M2 | HEART RATE: 75 BPM | TEMPERATURE: 99 F | DIASTOLIC BLOOD PRESSURE: 88 MMHG | OXYGEN SATURATION: 100 % | RESPIRATION RATE: 18 BRPM | WEIGHT: 129.88 LBS | SYSTOLIC BLOOD PRESSURE: 128 MMHG

## 2024-04-22 DIAGNOSIS — D50.9 IRON DEFICIENCY ANEMIA, UNSPECIFIED IRON DEFICIENCY ANEMIA TYPE: ICD-10-CM

## 2024-04-22 DIAGNOSIS — Z13.220 ENCOUNTER FOR LIPID SCREENING FOR CARDIOVASCULAR DISEASE: ICD-10-CM

## 2024-04-22 DIAGNOSIS — F41.9 RECURRENT MILD MAJOR DEPRESSIVE DISORDER WITH ANXIETY: Primary | ICD-10-CM

## 2024-04-22 DIAGNOSIS — Z11.59 ENCOUNTER FOR HEPATITIS C SCREENING TEST FOR LOW RISK PATIENT: ICD-10-CM

## 2024-04-22 DIAGNOSIS — Z23 NEED FOR DIPHTHERIA-TETANUS-PERTUSSIS (TDAP) VACCINE: ICD-10-CM

## 2024-04-22 DIAGNOSIS — F33.0 RECURRENT MILD MAJOR DEPRESSIVE DISORDER WITH ANXIETY: Primary | ICD-10-CM

## 2024-04-22 DIAGNOSIS — Z13.6 ENCOUNTER FOR LIPID SCREENING FOR CARDIOVASCULAR DISEASE: ICD-10-CM

## 2024-04-22 PROCEDURE — 99214 OFFICE O/P EST MOD 30 MIN: CPT | Mod: S$PBB,,, | Performed by: FAMILY MEDICINE

## 2024-04-22 PROCEDURE — 99214 OFFICE O/P EST MOD 30 MIN: CPT | Mod: PBBFAC | Performed by: FAMILY MEDICINE

## 2024-04-22 PROCEDURE — 3079F DIAST BP 80-89 MM HG: CPT | Mod: CPTII,,, | Performed by: FAMILY MEDICINE

## 2024-04-22 PROCEDURE — 3008F BODY MASS INDEX DOCD: CPT | Mod: CPTII,,, | Performed by: FAMILY MEDICINE

## 2024-04-22 PROCEDURE — 99999 PR PBB SHADOW E&M-EST. PATIENT-LVL IV: CPT | Mod: PBBFAC,,, | Performed by: FAMILY MEDICINE

## 2024-04-22 PROCEDURE — 1160F RVW MEDS BY RX/DR IN RCRD: CPT | Mod: CPTII,,, | Performed by: FAMILY MEDICINE

## 2024-04-22 PROCEDURE — 1159F MED LIST DOCD IN RCRD: CPT | Mod: CPTII,,, | Performed by: FAMILY MEDICINE

## 2024-04-22 PROCEDURE — 3074F SYST BP LT 130 MM HG: CPT | Mod: CPTII,,, | Performed by: FAMILY MEDICINE

## 2024-04-22 RX ORDER — TETANUS TOXOID, REDUCED DIPHTHERIA TOXOID AND ACELLULAR PERTUSSIS VACCINE, ADSORBED 5; 2.5; 8; 8; 2.5 [IU]/.5ML; [IU]/.5ML; UG/.5ML; UG/.5ML; UG/.5ML
0.5 SUSPENSION INTRAMUSCULAR ONCE
Qty: 0.5 ML | Refills: 0 | Status: SHIPPED | OUTPATIENT
Start: 2024-04-22 | End: 2024-04-22

## 2024-04-22 RX ORDER — LANOLIN ALCOHOL/MO/W.PET/CERES
1 CREAM (GRAM) TOPICAL
COMMUNITY
End: 2024-05-06 | Stop reason: DRUGHIGH

## 2024-04-22 RX ORDER — FLUOXETINE HYDROCHLORIDE 20 MG/1
20 CAPSULE ORAL DAILY
Qty: 30 CAPSULE | Refills: 1 | Status: SHIPPED | OUTPATIENT
Start: 2024-04-22 | End: 2024-05-06 | Stop reason: SDUPTHER

## 2024-04-22 NOTE — PROGRESS NOTES
OFFICE VISIT 24  2:00 PM CDT    This is my first time treating Betty.  Betty is requesting that I assume the role of their primary care provider.    She is approximately seven months postpartum after delivering her first child by . She reports the baby is doing well. She has chronic recurrent major depressive disorder previously treated with fluoxetine, but she was off this medication during her pregnancy. She also has chronic anxiety. She was on Xanax prior to her pregnancy. She has not restarted either of these medications since then. She reports no thoughts of wanting to harm herself or her baby. She describes the severity of her symptoms as mild, moderate at worst, particularly the anxiety. We discussed the risks and benefits of treatment options. It was agreed to reinitiate fluoxetine, and she would schedule an appointment with a mental health counselor and schedule a follow-up appointment with me soon for reevaluation for consideration of dose adjustment of her medication and possibly reinitiating benzodiazepine as needed.    Previous labs showed mild iron deficiency anemia, and she appears to be due for a screening lipid panel and hepatitis C screening. Vaccinations are up to date except for Tdap, COVID booster, and influenza. Each of these was recommended, and I explained she can receive them at her pharmacy of choice if she does not get them here.       Review of Systems   Psychiatric/Behavioral:  Positive for dysphoric mood. Negative for agitation and suicidal ideas. The patient is not hyperactive.    All other systems reviewed and are negative.    Assessment & Plan       1. Recurrent mild major depressive disorder with anxiety  -     FLUoxetine 20 MG capsule; Take 1 capsule (20 mg total) by mouth once daily.  Dispense: 30 capsule; Refill: 1  -     Ambulatory referral/consult to Psychology; Future; Expected date: 2024    2. Iron deficiency anemia, unspecified iron deficiency anemia  "type  -     CBC Auto Differential; Future; Expected date: 04/22/2024  -     Ferritin; Future; Expected date: 04/22/2024  -     Iron and TIBC; Future; Expected date: 04/22/2024    3. Encounter for hepatitis C screening test for low risk patient  -     HEPATITIS C ANTIBODY; Future; Expected date: 04/22/2024    4. Encounter for lipid screening for cardiovascular disease  -     Lipid Panel; Future; Expected date: 04/22/2024    5. Need for diphtheria-tetanus-pertussis (Tdap) vaccine  -     diphth,pertus,acell,,tetanus (BOOSTRIX TDAP) 2.5-8-5 Lf-mcg-Lf/0.5mL Syrg injection; Inject 0.5 mLs into the muscle once. for 1 dose  Dispense: 0.5 mL; Refill: 0    No other significant complaints or concerns were reported.    Vitals:    04/22/24 1349   BP: 128/88   BP Location: Right arm   Patient Position: Sitting   BP Method: Small (Manual)   Pulse: 75   Resp: 18   Temp: 98.6 °F (37 °C)   SpO2: 100%   Weight: 58.9 kg (129 lb 13.6 oz)   Height: 5' 1" (1.549 m)   Physical Exam  Vitals reviewed.   Constitutional:       General: She is not in acute distress.     Appearance: Normal appearance.   Eyes:      General: No scleral icterus.     Conjunctiva/sclera: Conjunctivae normal.   Cardiovascular:      Rate and Rhythm: Normal rate and regular rhythm.      Heart sounds: Normal heart sounds.   Pulmonary:      Effort: Pulmonary effort is normal. No respiratory distress.      Breath sounds: Normal breath sounds. No wheezing or rhonchi.   Abdominal:      General: Bowel sounds are normal. There is no distension.      Palpations: Abdomen is soft. There is no mass.      Tenderness: There is no abdominal tenderness.   Skin:     General: Skin is warm and dry.      Coloration: Skin is not jaundiced.   Neurological:      General: No focal deficit present.      Mental Status: She is alert and oriented to person, place, and time.   Psychiatric:         Mood and Affect: Mood normal.         Behavior: Behavior normal.         Thought Content: Thought " "content normal.         Judgment: Judgment normal.       This note was generated with the assistance of ambient listening technology. Verbal consent was obtained by the patient and accompanying visitor(s) for the recording of patient appointment to facilitate this note. I attest to having reviewed and edited the generated note for accuracy, though some syntax or spelling errors may persist. Please contact the author of this note for any clarification.    Documentation entered by me for this encounter may have been done in part using speech-recognition technology. Although I have made an effort to ensure accuracy, "sound like" errors may exist and should be interpreted in context.   "

## 2024-04-22 NOTE — PATIENT INSTRUCTIONS
I've listed below some mental health providers whom I believe are accepting new patients with your insurance. You should be able to get the mental health care you need at one of the centers listed below. Please contact them ASAP to schedule your first appointment. Tell them you were referred by your primary care provider.    [facility]  [address], [place] [address]  Phone: [phone]  Fax: [phone]  [email or web address]  [address] [place] [address]  Phone: [phone]  Fax: [phone]  [email or web address]     [facility] [place] [address] [place] [address]  Phone: [phone]  [email or web address]    [facility]  [address] [place] [address], [address]  Phone: [phone]  [email or web address]    [facility]  [address], [address]  Phone: [phone]  [email or web address]     [facility]  [address], [address]  [place] [address]  Phone: [phone]   Fax: [phone]   E-mail: [email or web address]  [email or web address]/index.cfm/directory/detail/21484/[email or web address]/325    [facility]  [address] [address]  Phone: [phone]  [email or web address]     [facility]  [address]  [place] [address]  Phone: [phone]  [email or web address]    [facility]  [address] [place] [address]  Phone: [phone]  [email or web address]    [facility]  [address]  [place] [address]  Phone:  [phone]  Fax: [phone]  [email or web address]/index.cfm/directory/detail/9806/catid/325    Need Support Now?  If you or someone you know is struggling or in crisis, help is available. Call or text 508 or chat ooma  608 offers 24/7 access to trained crisis counselors who can help people experiencing mental health-related distress. That could be:  Thoughts of suicide,  Mental health or substance use crisis, or  Any other kind of emotion distress.

## 2024-05-03 ENCOUNTER — LAB VISIT (OUTPATIENT)
Dept: LAB | Facility: HOSPITAL | Age: 39
End: 2024-05-03
Attending: FAMILY MEDICINE
Payer: MEDICAID

## 2024-05-03 ENCOUNTER — TELEPHONE (OUTPATIENT)
Dept: INTERNAL MEDICINE | Facility: CLINIC | Age: 39
End: 2024-05-03
Payer: MEDICAID

## 2024-05-03 ENCOUNTER — PATIENT MESSAGE (OUTPATIENT)
Dept: OBSTETRICS AND GYNECOLOGY | Facility: CLINIC | Age: 39
End: 2024-05-03
Payer: MEDICAID

## 2024-05-03 DIAGNOSIS — Z13.6 ENCOUNTER FOR LIPID SCREENING FOR CARDIOVASCULAR DISEASE: ICD-10-CM

## 2024-05-03 DIAGNOSIS — D50.9 IRON DEFICIENCY ANEMIA, UNSPECIFIED IRON DEFICIENCY ANEMIA TYPE: ICD-10-CM

## 2024-05-03 DIAGNOSIS — Z13.220 ENCOUNTER FOR LIPID SCREENING FOR CARDIOVASCULAR DISEASE: ICD-10-CM

## 2024-05-03 DIAGNOSIS — Z11.59 ENCOUNTER FOR HEPATITIS C SCREENING TEST FOR LOW RISK PATIENT: ICD-10-CM

## 2024-05-03 LAB
BASOPHILS # BLD AUTO: 0.05 K/UL (ref 0–0.2)
BASOPHILS NFR BLD: 1.3 % (ref 0–1.9)
CHOLEST SERPL-MCNC: 132 MG/DL (ref 120–199)
CHOLEST/HDLC SERPL: 2.9 {RATIO} (ref 2–5)
DIFFERENTIAL METHOD BLD: ABNORMAL
EOSINOPHIL # BLD AUTO: 0.1 K/UL (ref 0–0.5)
EOSINOPHIL NFR BLD: 1.6 % (ref 0–8)
ERYTHROCYTE [DISTWIDTH] IN BLOOD BY AUTOMATED COUNT: 14.8 % (ref 11.5–14.5)
FERRITIN SERPL-MCNC: 53 NG/ML (ref 20–300)
HCT VFR BLD AUTO: 38.9 % (ref 37–48.5)
HDLC SERPL-MCNC: 46 MG/DL (ref 40–75)
HDLC SERPL: 34.8 % (ref 20–50)
HGB BLD-MCNC: 11.8 G/DL (ref 12–16)
IMM GRANULOCYTES # BLD AUTO: 0 K/UL (ref 0–0.04)
IMM GRANULOCYTES NFR BLD AUTO: 0 % (ref 0–0.5)
IRON SERPL-MCNC: 66 UG/DL (ref 30–160)
LDLC SERPL CALC-MCNC: 79 MG/DL (ref 63–159)
LYMPHOCYTES # BLD AUTO: 1.7 K/UL (ref 1–4.8)
LYMPHOCYTES NFR BLD: 43.7 % (ref 18–48)
MCH RBC QN AUTO: 24.2 PG (ref 27–31)
MCHC RBC AUTO-ENTMCNC: 30.3 G/DL (ref 32–36)
MCV RBC AUTO: 80 FL (ref 82–98)
MONOCYTES # BLD AUTO: 0.2 K/UL (ref 0.3–1)
MONOCYTES NFR BLD: 6.3 % (ref 4–15)
NEUTROPHILS # BLD AUTO: 1.8 K/UL (ref 1.8–7.7)
NEUTROPHILS NFR BLD: 47.1 % (ref 38–73)
NONHDLC SERPL-MCNC: 86 MG/DL
NRBC BLD-RTO: 0 /100 WBC
PLATELET # BLD AUTO: 395 K/UL (ref 150–450)
PMV BLD AUTO: 11.4 FL (ref 9.2–12.9)
RBC # BLD AUTO: 4.88 M/UL (ref 4–5.4)
SATURATED IRON: 18 % (ref 20–50)
TOTAL IRON BINDING CAPACITY: 358 UG/DL (ref 250–450)
TRANSFERRIN SERPL-MCNC: 242 MG/DL (ref 200–375)
TRIGL SERPL-MCNC: 35 MG/DL (ref 30–150)
WBC # BLD AUTO: 3.78 K/UL (ref 3.9–12.7)

## 2024-05-03 PROCEDURE — 36415 COLL VENOUS BLD VENIPUNCTURE: CPT | Performed by: FAMILY MEDICINE

## 2024-05-03 PROCEDURE — 83540 ASSAY OF IRON: CPT | Performed by: FAMILY MEDICINE

## 2024-05-03 PROCEDURE — 85025 COMPLETE CBC W/AUTO DIFF WBC: CPT | Performed by: FAMILY MEDICINE

## 2024-05-03 PROCEDURE — 80061 LIPID PANEL: CPT | Performed by: FAMILY MEDICINE

## 2024-05-03 PROCEDURE — 82728 ASSAY OF FERRITIN: CPT | Performed by: FAMILY MEDICINE

## 2024-05-03 PROCEDURE — 86803 HEPATITIS C AB TEST: CPT | Performed by: FAMILY MEDICINE

## 2024-05-03 NOTE — TELEPHONE ENCOUNTER
----- Message from Briseyda Caro sent at 5/3/2024  1:11 PM CDT -----  Contact: Betty Silva is calling in regards to getting a new PCP.please call back at .[phone]           Thanks  DAGO

## 2024-05-04 ENCOUNTER — PATIENT MESSAGE (OUTPATIENT)
Dept: INTERNAL MEDICINE | Facility: CLINIC | Age: 39
End: 2024-05-04
Payer: MEDICAID

## 2024-05-04 LAB — HCV AB SERPL QL IA: NORMAL

## 2024-05-05 ENCOUNTER — PATIENT MESSAGE (OUTPATIENT)
Dept: REHABILITATION | Facility: HOSPITAL | Age: 39
End: 2024-05-05
Payer: MEDICAID

## 2024-05-06 ENCOUNTER — PATIENT MESSAGE (OUTPATIENT)
Dept: INTERNAL MEDICINE | Facility: CLINIC | Age: 39
End: 2024-05-06

## 2024-05-06 ENCOUNTER — E-CONSULT (OUTPATIENT)
Dept: RHEUMATOLOGY | Facility: CLINIC | Age: 39
End: 2024-05-06
Payer: MEDICAID

## 2024-05-06 ENCOUNTER — OFFICE VISIT (OUTPATIENT)
Dept: INTERNAL MEDICINE | Facility: CLINIC | Age: 39
End: 2024-05-06
Payer: MEDICAID

## 2024-05-06 DIAGNOSIS — R76.8 ANA POSITIVE: Primary | Chronic | ICD-10-CM

## 2024-05-06 DIAGNOSIS — R76.8 ANA POSITIVE: Chronic | ICD-10-CM

## 2024-05-06 DIAGNOSIS — M06.4 INFLAMMATORY POLYARTHRITIS: Chronic | ICD-10-CM

## 2024-05-06 DIAGNOSIS — F41.0 GENERALIZED ANXIETY DISORDER WITH PANIC ATTACKS: Chronic | ICD-10-CM

## 2024-05-06 DIAGNOSIS — D50.8 IRON DEFICIENCY ANEMIA SECONDARY TO INADEQUATE DIETARY IRON INTAKE: Chronic | ICD-10-CM

## 2024-05-06 DIAGNOSIS — F33.0 RECURRENT MILD MAJOR DEPRESSIVE DISORDER WITH ANXIETY: Primary | Chronic | ICD-10-CM

## 2024-05-06 DIAGNOSIS — F41.9 RECURRENT MILD MAJOR DEPRESSIVE DISORDER WITH ANXIETY: Primary | Chronic | ICD-10-CM

## 2024-05-06 DIAGNOSIS — F41.1 GENERALIZED ANXIETY DISORDER WITH PANIC ATTACKS: Chronic | ICD-10-CM

## 2024-05-06 DIAGNOSIS — L70.0 ACNE VULGARIS: Chronic | ICD-10-CM

## 2024-05-06 PROCEDURE — 1159F MED LIST DOCD IN RCRD: CPT | Mod: CPTII,95,, | Performed by: FAMILY MEDICINE

## 2024-05-06 PROCEDURE — 99451 NTRPROF PH1/NTRNET/EHR 5/>: CPT | Mod: S$PBB,,, | Performed by: INTERNAL MEDICINE

## 2024-05-06 PROCEDURE — G2211 COMPLEX E/M VISIT ADD ON: HCPCS | Mod: 95,,, | Performed by: FAMILY MEDICINE

## 2024-05-06 PROCEDURE — 1160F RVW MEDS BY RX/DR IN RCRD: CPT | Mod: CPTII,95,, | Performed by: FAMILY MEDICINE

## 2024-05-06 PROCEDURE — 99214 OFFICE O/P EST MOD 30 MIN: CPT | Mod: 95,,, | Performed by: FAMILY MEDICINE

## 2024-05-06 RX ORDER — BENZOYL PEROXIDE 10 G/100G
GEL TOPICAL
Qty: 45 G | Refills: 2 | Status: SHIPPED | OUTPATIENT
Start: 2024-05-06

## 2024-05-06 RX ORDER — ALPRAZOLAM 0.25 MG/1
0.25 TABLET ORAL DAILY PRN
Qty: 30 TABLET | Refills: 5 | Status: SHIPPED | OUTPATIENT
Start: 2024-05-06

## 2024-05-06 RX ORDER — DOXYCYCLINE 100 MG/1
100 CAPSULE ORAL 2 TIMES DAILY
Qty: 60 CAPSULE | Refills: 2 | Status: SHIPPED | OUTPATIENT
Start: 2024-05-06 | End: 2024-08-04

## 2024-05-06 RX ORDER — FLUOXETINE HYDROCHLORIDE 40 MG/1
40 CAPSULE ORAL DAILY
Qty: 90 CAPSULE | Refills: 1 | Status: SHIPPED | OUTPATIENT
Start: 2024-05-06

## 2024-05-06 RX ORDER — TRETINOIN 0.5 MG/G
CREAM TOPICAL
Qty: 45 G | Refills: 2 | Status: SHIPPED | OUTPATIENT
Start: 2024-05-06

## 2024-05-06 RX ORDER — FERROUS SULFATE 325(65) MG
650 TABLET ORAL EVERY OTHER DAY
Qty: 90 TABLET | Refills: 3 | Status: SHIPPED | OUTPATIENT
Start: 2024-05-06 | End: 2025-05-06

## 2024-05-06 NOTE — TELEPHONE ENCOUNTER
Pt would like to know if she is eligible for Digital Medicine Hypertension program. Please advise.//ddw

## 2024-05-06 NOTE — PROGRESS NOTES
TELEMEDICINE VIRTUAL VIDEO VISIT  5/6/24  3:40 PM CDT    Visit Type: Audiovisual    Patient's Location: Betty represents that they are located within the state [facility].    History of Present Illness    Betty presents today for follow-up.    The patient shared her previous participation in a Hypertension program designed for pregnant women with recent measurements within the range of 117/82 to 130/90. There is no current treatment or medication for hypertension.    Her recent labs showed mild anemia and marginally low iron levels. She has been on an over the counter iron supplement since her pregnancy with no GI side effects.    She has had acne over the years, with flare-ups managed previously with Minocycline and hormonal birth control. Currently, she is using OTC topical treatments of Clean and Clear and Oxy 10. Her skin is generally oily.    Diagnosed anxiety with associated panic attacks are treated with Fluoxetine 20mg, but there is insufficient control of symptoms. She experienced a panic attack after the last consultation and noticed a difference following a decrease in Fluoxetine dosage. She prefers to resume Alprazolam, used previously to manage acute panic attack episodes, or an alternative medication with the same effect. No history of chemical dependency.    Previously tested positive for FREDY and diagnosed with rheumatoid arthritis, she has not been on any treatment due to breastfeeding concerns and has not been actively seen by a rheumatologist due to insurance issues. She reports significant pain and struggles with getting a diagnosis.    She has been taking an OTC iron supplement daily. She previously took Fluoxetine and Alprazolam prior to pregnancy. She indicates the need to resume her prior Fluoxetine dosage of 40 mg daily and to resume Alprazolam for management of panic attacks.       Review of Systems   Constitutional:  Negative for activity change and unexpected weight change.   HENT:   Negative for hearing loss, rhinorrhea and trouble swallowing.    Eyes:  Negative for discharge and visual disturbance.   Respiratory:  Negative for chest tightness and wheezing.    Cardiovascular:  Negative for chest pain and palpitations.   Gastrointestinal:  Negative for blood in stool, constipation, diarrhea and vomiting.   Endocrine: Negative for polydipsia and polyuria.   Genitourinary:  Negative for difficulty urinating, dysuria, hematuria and menstrual problem.   Musculoskeletal:  Positive for arthralgias. Negative for joint swelling and neck pain.   Neurological:  Negative for weakness and headaches.   Psychiatric/Behavioral:  Positive for dysphoric mood. Negative for confusion, hallucinations and suicidal ideas. The patient is not hyperactive.        Assessment & Plan     Increased the patient's fluoxetine dosage from 20 mg to 40 mg daily and restarted alprazolam at 0.25 mg daily as needed for anxiety.   Altered the patient's iron supplement regimen to 2 tablets every other day.   Prescribed doxycycline, benzoyl peroxide gel 10%, and tretinoin for acne treatment.   Ordered consultations with a rheumatologist and a dermatologist for further evaluation of the patient's rheumatoid arthritis and acne, respectively.   Explained to the patient the reasons for the changes in her medications and the need for specialist consultations.   Educated the patient about her lab results and blood pressure status.   Informed the patient about the effects and expected outcomes of the prescribed acne treatments.   Follow up in about 3 months.       1. Recurrent mild major depressive disorder with anxiety  -     FLUoxetine 40 MG capsule; Take 1 capsule (40 mg total) by mouth once daily.  Dispense: 90 capsule; Refill: 1    2. Generalized anxiety disorder with panic attacks  -     FLUoxetine 40 MG capsule; Take 1 capsule (40 mg total) by mouth once daily.  Dispense: 90 capsule; Refill: 1  -     ALPRAZolam (XANAX) 0.25 MG tablet;  Take 1 tablet (0.25 mg total) by mouth daily as needed for Anxiety.  Dispense: 30 tablet; Refill: 5    3. Iron deficiency anemia secondary to inadequate dietary iron intake  -     ferrous sulfate (FEOSOL) 325 mg (65 mg iron) Tab tablet; Take 2 tablets (650 mg total) by mouth every other day.  Dispense: 90 tablet; Refill: 3    4. Acne vulgaris  -     benzoyl peroxide 10% 10 % gel; Apply to affected areas as directed.  Dispense: 45 g; Refill: 2  -     tretinoin (RETIN-A) 0.05 % cream; Apply to affected area nightly  Dispense: 45 g; Refill: 2  -     Ambulatory referral/consult to Dermatology; Future; Expected date: 05/13/2024  -     doxycycline (VIBRAMYCIN) 100 MG Cap; Take 1 capsule (100 mg total) by mouth 2 (two) times daily.  Dispense: 60 capsule; Refill: 2    5. FREDY positive  Overview:  See [facility] Rheumatology notes from 2023.    Orders:  -     E-Consult to Rheumatology    6. Inflammatory polyarthritis  Overview:  See [facility] Rheumatology notes from 2023.    Orders:  -     E-Consult to Rheumatology    No other significant complaints or concerns were reported.  Refer to Patient Portal Message for additional education/instructions provided.  Today's visit involved the intricate management of episodic problem(s) and the ongoing care for the patient's serious or complex condition(s) listed above, reflecting the inherent complexity of providing longitudinal, comprehensive evaluation and management as the central hub for the patient's primary care services.  There were no vitals filed for this visit.  Physical Exam    Constitutional: No acute distress. Normal appearance. Not ill-appearing.  Pulmonary: Pulmonary effort is normal. No respiratory distress.  Skin: Skin is not jaundiced. INFLAMED SKIN LESIONS.  Neurological: Alert. Mental status is at baseline.  Psychiatric: Mood normal. Behavior normal. Thought content normal.         This note was generated with the assistance of ambient listening  "technology. Verbal consent was obtained by the patient and accompanying visitor(s) for the recording of patient appointment to facilitate this note. I attest to having reviewed and edited the generated note for accuracy, though some syntax or spelling errors may persist. Please contact the author of this note for any clarification.      TOTAL TIME evaluating and managing this patient for this encounter was greater than or equal to 30 minutes.  This time was exclusive of any separately billable procedures for this patient and exclusive of time spent treating any other patient.    Documentation entered by me for this encounter may have been done in part using speech-recognition technology. Although I have made an effort to ensure accuracy, "sound like" errors may exist and should be interpreted in context.    Each patient to whom medical services are provided by telemedicine is: (1) informed of the relationship between the physician and patient and the respective role of any other health care provider with respect to management of the patient; and (2) notified that he or she may decline to receive medical services by telemedicine and may withdraw from such care at any time.     Orders Placed This Encounter    E-Consult to Rheumatology     Order Specific Question:   Consent has been obtained from patient, and patient is aware of applicable cost? Pending specialist evaluation, I will follow up with the patient.     Answer:   Yes     Order Specific Question:   Medical Condition:     Answer:   Symmetric Inflammatory Arthritis     Order Specific Question:   What is your clinical question?     Answer:   She reports Dx of RA. 10/24/23 and 12/22/23 [facility] rheumatology notes Dx = "inflammatory polyarthritis". Last FREDY profile 11/28/23. QUESTION: What do you recommend as next step for evaluation/treatment?     Order Specific Question:   Total time spent preparing for the referral and/or communicating with the " consulting physician:     Answer:   Time spent preparing this E-consult and/or communicating with the consulting physician was incorporated into total time spent evaluating and managing this patient for this encounter and was not billed separately.

## 2024-05-06 NOTE — CONSULTS
"[facility] - Rheumatology  Response for E-Consult     Patient Name: Betty Felipe  MRN: [id number]  Primary Care Provider: CAMDEN Duarte MD   Requesting Provider: CAMDEN Duarte MD  Consults    Medical Condition: Symmetric Inflammatory Arthritis   What is your clinical question? She reports Dx of RA. 10/24/23 and 12/22/23 [facility] rheumatology notes Dx = "inflammatory polyarthritis". Last FREDY profile 11/28/23. QUESTION: What do you recommend as next step for evaluation/treatment?           After evaluation of your eConsult clinical questions, I believe the patient should be scheduled for an office visit in our specialty due to History of inflammatory polyarthritis with low titer RNP antibody positivity. She should have a clinical exam done to evaluate for synovitis. In the interim, I would recommend ordering MRI bilateral hands and wrist with and without contrast - RA protocol to evaluate for any underlying synovitis- [id number] .       Total time of Consultation: 5 minute    *This eConsult is based on the clinical data available to me and is furnished without benefit of a physician examination.  The eConsult will need to be interpreted in light of any clinical issues of changes in patient status not available to me at the time rime of filing this eConsults.  Significant changes in patient condition of level of acuity should result in a referral for in person consultation and reevaluation.  Please alert me if you have any furth questions.     Thank you for this eConsult referral.     Kody Lacy MD  [facility] Rheumatology      "

## 2024-05-08 ENCOUNTER — TELEPHONE (OUTPATIENT)
Dept: DERMATOLOGY | Facility: CLINIC | Age: 39
End: 2024-05-08
Payer: MEDICAID

## 2024-05-08 NOTE — TELEPHONE ENCOUNTER
Returned call. Pt requesting to be seen by provider with referral. Pt informed we do not have any new patient slots with for insurance. Pt would like to speak with supervisor because son who has same insurance was recently seen as a new patient. Message sent to supervisor. Informed her that she is out but will return on monday.   ----- Message from Ana Cristina Zapata sent at 5/8/2024  1:27 PM CDT -----  Type:  Patient Returning Call    Who Called:pt  Who Left Message for Patient:nurse  Does the patient know what this is regarding?:return call  Would the patient rather a call back or a response via [facility]? call  Best Call Back Number:[phone]  Additional Information: .    Thank you

## 2024-05-20 ENCOUNTER — TELEPHONE (OUTPATIENT)
Dept: PAIN MEDICINE | Facility: CLINIC | Age: 39
End: 2024-05-20
Payer: MEDICAID

## 2024-05-20 NOTE — TELEPHONE ENCOUNTER
S/w pt and advised that all insurance slots for her insurance are filled./ advised her to reach out to [facility] and price

## 2024-05-20 NOTE — TELEPHONE ENCOUNTER
----- Message from Gina Silva RN sent at 5/8/2024  4:56 PM CDT -----  Pt would like to speak with supervisor. She is a new patient with referral with medicaid. Informed pt we did not have any new patients slots for her type of insurance available. Pt reports her son was seen recently as a new patient with same insurance and is wondering why that is ok. Please advise  ----- Message -----  From: Ana Cristina Zapata  Sent: 5/8/2024   1:29 PM CDT  To: Nagi Huntley Staff    Type:  Patient Returning Call    Who Called:pt  Who Left Message for Patient:nurse  Does the patient know what this is regarding?:return call  Would the patient rather a call back or a response via [facility]? call  Best Call Back Number:[phone]  Additional Information: .    Thank you

## 2024-06-03 ENCOUNTER — PATIENT MESSAGE (OUTPATIENT)
Dept: INTERNAL MEDICINE | Facility: CLINIC | Age: 39
End: 2024-06-03
Payer: MEDICAID

## 2024-06-03 DIAGNOSIS — M06.4 INFLAMMATORY POLYARTHRITIS: Primary | Chronic | ICD-10-CM

## 2024-06-03 NOTE — PROGRESS NOTES
Betty Espinal.    I hope this message finds you well.    In reviewing your chart, I couldn't tell if you had already read the E-consult response from the rheumatologist about your arthritis. In essence, they felt that they did not have enough information from your lab results and from the documentation of your previous rheumatologist, Roni Arevalo MD, to make a definitive plan. They recommended that you schedule a rheumatology appointment for an in-person evaluation.    I've entered a rheumatology referral order for you (copy attached), but I've been unsuccessful in identifying a rheumatologist who accepts your insurance and has a timely appointment available.    Your Next Steps:  Call the customer service number on your medical insurance card.  Notify them that I have given you a referral order to see a rheumatologist , and that the referral is medically necessary.  Ask them to provide you with the name and contact information for a rheumatologist  who accepts your insurance and has a timely appointment available.  Follow the instructions from your insurance company for scheduling your appointment with the rheumatologist.    Let me know if I can do anything else for you.     I look forward to seeing you at your next appointment on Tuesday, August 6th.    All the best,    Dr. FOLEY

## 2024-07-17 ENCOUNTER — PATIENT MESSAGE (OUTPATIENT)
Dept: INTERNAL MEDICINE | Facility: CLINIC | Age: 39
End: 2024-07-17
Payer: MEDICAID

## 2024-07-30 DIAGNOSIS — D50.8 IRON DEFICIENCY ANEMIA SECONDARY TO INADEQUATE DIETARY IRON INTAKE: Chronic | ICD-10-CM

## 2024-07-30 DIAGNOSIS — F41.0 GENERALIZED ANXIETY DISORDER WITH PANIC ATTACKS: Chronic | ICD-10-CM

## 2024-07-30 DIAGNOSIS — L70.0 ACNE VULGARIS: Chronic | ICD-10-CM

## 2024-07-30 DIAGNOSIS — F41.9 RECURRENT MILD MAJOR DEPRESSIVE DISORDER WITH ANXIETY: Chronic | ICD-10-CM

## 2024-07-30 DIAGNOSIS — F41.1 GENERALIZED ANXIETY DISORDER WITH PANIC ATTACKS: Chronic | ICD-10-CM

## 2024-07-30 DIAGNOSIS — F33.0 RECURRENT MILD MAJOR DEPRESSIVE DISORDER WITH ANXIETY: Chronic | ICD-10-CM

## 2024-07-31 ENCOUNTER — TELEPHONE (OUTPATIENT)
Dept: INTERNAL MEDICINE | Facility: CLINIC | Age: 39
End: 2024-07-31
Payer: MEDICAID

## 2024-07-31 ENCOUNTER — PATIENT MESSAGE (OUTPATIENT)
Dept: INTERNAL MEDICINE | Facility: CLINIC | Age: 39
End: 2024-07-31
Payer: MEDICAID

## 2024-07-31 RX ORDER — ALPRAZOLAM 0.25 MG/1
0.25 TABLET ORAL DAILY PRN
Qty: 30 TABLET | Refills: 5 | OUTPATIENT
Start: 2024-07-31

## 2024-07-31 RX ORDER — BENZOYL PEROXIDE 10 G/100G
GEL TOPICAL
Qty: 45 G | Refills: 2 | OUTPATIENT
Start: 2024-07-31

## 2024-07-31 RX ORDER — FLUOXETINE HYDROCHLORIDE 40 MG/1
40 CAPSULE ORAL DAILY
Qty: 90 CAPSULE | Refills: 1 | OUTPATIENT
Start: 2024-07-31

## 2024-07-31 RX ORDER — FERROUS SULFATE 325(65) MG
650 TABLET ORAL EVERY OTHER DAY
Qty: 90 TABLET | Refills: 3 | OUTPATIENT
Start: 2024-07-31 | End: 2025-07-31

## 2024-07-31 RX ORDER — TRETINOIN 0.5 MG/G
CREAM TOPICAL
Qty: 45 G | Refills: 2 | OUTPATIENT
Start: 2024-07-31

## 2024-07-31 RX ORDER — DOXYCYCLINE 100 MG/1
100 CAPSULE ORAL 2 TIMES DAILY
Qty: 60 CAPSULE | Refills: 2 | OUTPATIENT
Start: 2024-07-31 | End: 2024-10-29

## 2024-07-31 NOTE — TELEPHONE ENCOUNTER
----- Message from Keyanna Bell sent at 7/31/2024 12:30 PM CDT -----  Contact: Betty Hernández is needing a call back regarding her medication. Pt refused to give me more information. Please call back at [phone].    Thank you keyanna

## 2024-07-31 NOTE — TELEPHONE ENCOUNTER
REFILL NOT APPROVED  REASON: She has upcoming appointment soon. We can address this and any other refills at that appointment.  Requested Prescriptions   Pending Prescriptions Disp Refills    benzoyl peroxide 10% 10 % gel 45 g 2     Sig: Apply to affected areas as directed.    tretinoin (RETIN-A) 0.05 % cream 45 g 2     Sig: Apply to affected area nightly    ferrous sulfate (FEOSOL) 325 mg (65 mg iron) Tab tablet 90 tablet 3     Sig: Take 2 tablets (650 mg total) by mouth every other day.    FLUoxetine 40 MG capsule 90 capsule 1     Sig: Take 1 capsule (40 mg total) by mouth once daily.    ALPRAZolam (XANAX) 0.25 MG tablet 30 tablet 5     Sig: Take 1 tablet (0.25 mg total) by mouth daily as needed for Anxiety.    doxycycline (VIBRAMYCIN) 100 MG Cap 60 capsule 2     Sig: Take 1 capsule (100 mg total) by mouth 2 (two) times daily.      #LMRX   Future Appointments   Date Time Provider Department Center   8/1/2024  4:00 PM CAMDEN Duarte MD [facility]   9/17/2024  2:00 PM Kristan Matson NP ON OBGYN  Medical

## 2024-07-31 NOTE — TELEPHONE ENCOUNTER
You  CAMDEN Duarte MDJust now (1:27 PM)     JEISON  Please advise pt is needed before upcoming appointment. Thank you.     You  Betty Torres now (1:27 PM)     JEISON  You have a 4:00 pm virtual appointment on tomorrow 08/01/2024 at 4:00 pm with Dr. Duarte. It is best to keep this scheduled appointment and address your concerns directly with the provider. Per Dr. Duarte, he will address your concerns at your upcoming appointment.     This [place] message has not been read.     Isabel Orozco LPN routed conversation to Gerald Ley Staff10 minutes ago (1:17 PM)     Betty REYES Virtual Nurse Message Pool ([employer]) (supporting CAMDEN Duarte MD)49 minutes ago (12:38 PM)       Please give me a call. He and I discussed two of the medications in our last appointment and the others were never properly filled at my pharmacy.        MARIBEL Brown1 hour ago (11:59 AM)     DW  Good morning,   Please see documentation below:         Betty REYES Virtual Nurse Message Pool ([employer]) (supporting CAMDEN Duarte MD)1 hour ago (11:51 AM)       Good afternoon,      Please include the reason the medication renewals were declined.

## 2024-07-31 NOTE — TELEPHONE ENCOUNTER
No care due was identified.  [facility] Embedded Care Due Messages. Reference number: [id number].   7/30/2024 8:55:40 PM CDT

## 2024-08-01 ENCOUNTER — PATIENT MESSAGE (OUTPATIENT)
Dept: INTERNAL MEDICINE | Facility: CLINIC | Age: 39
End: 2024-08-01

## 2024-08-01 ENCOUNTER — OFFICE VISIT (OUTPATIENT)
Dept: INTERNAL MEDICINE | Facility: CLINIC | Age: 39
End: 2024-08-01
Payer: MEDICAID

## 2024-08-01 DIAGNOSIS — L70.0 ACNE VULGARIS: Chronic | ICD-10-CM

## 2024-08-01 DIAGNOSIS — R76.8 ANA POSITIVE: Chronic | ICD-10-CM

## 2024-08-01 DIAGNOSIS — F33.0 RECURRENT MILD MAJOR DEPRESSIVE DISORDER WITH ANXIETY: Chronic | ICD-10-CM

## 2024-08-01 DIAGNOSIS — F41.9 RECURRENT MILD MAJOR DEPRESSIVE DISORDER WITH ANXIETY: Chronic | ICD-10-CM

## 2024-08-01 DIAGNOSIS — F41.1 GENERALIZED ANXIETY DISORDER WITH PANIC ATTACKS: Primary | Chronic | ICD-10-CM

## 2024-08-01 DIAGNOSIS — F41.0 GENERALIZED ANXIETY DISORDER WITH PANIC ATTACKS: Primary | Chronic | ICD-10-CM

## 2024-08-01 DIAGNOSIS — M06.4 INFLAMMATORY POLYARTHRITIS: Chronic | ICD-10-CM

## 2024-08-01 DIAGNOSIS — D50.8 IRON DEFICIENCY ANEMIA SECONDARY TO INADEQUATE DIETARY IRON INTAKE: Chronic | ICD-10-CM

## 2024-08-01 PROCEDURE — G2211 COMPLEX E/M VISIT ADD ON: HCPCS | Mod: 95,,, | Performed by: FAMILY MEDICINE

## 2024-08-01 PROCEDURE — 1160F RVW MEDS BY RX/DR IN RCRD: CPT | Mod: CPTII,95,, | Performed by: FAMILY MEDICINE

## 2024-08-01 PROCEDURE — 99215 OFFICE O/P EST HI 40 MIN: CPT | Mod: 95,,, | Performed by: FAMILY MEDICINE

## 2024-08-01 PROCEDURE — 1159F MED LIST DOCD IN RCRD: CPT | Mod: CPTII,95,, | Performed by: FAMILY MEDICINE

## 2024-08-01 RX ORDER — FERROUS SULFATE 325(65) MG
650 TABLET ORAL EVERY OTHER DAY
Qty: 90 TABLET | Refills: 3 | Status: SHIPPED | OUTPATIENT
Start: 2024-08-01 | End: 2025-08-01

## 2024-08-01 RX ORDER — FLUOXETINE HYDROCHLORIDE 20 MG/1
20 CAPSULE ORAL DAILY
Qty: 90 CAPSULE | Refills: 3 | Status: SHIPPED | OUTPATIENT
Start: 2024-08-01

## 2024-08-01 RX ORDER — TRETINOIN 0.5 MG/G
CREAM TOPICAL
Qty: 45 G | Refills: 2 | Status: SHIPPED | OUTPATIENT
Start: 2024-08-01

## 2024-08-01 RX ORDER — BUSPIRONE HYDROCHLORIDE 30 MG/1
TABLET ORAL
Qty: 60 TABLET | Refills: 2 | Status: SHIPPED | OUTPATIENT
Start: 2024-08-01

## 2024-08-01 RX ORDER — BENZOYL PEROXIDE 10 G/100G
GEL TOPICAL
Qty: 45 G | Refills: 2 | Status: SHIPPED | OUTPATIENT
Start: 2024-08-01

## 2024-08-01 RX ORDER — ALPRAZOLAM 0.25 MG/1
0.25 TABLET ORAL DAILY PRN
Qty: 30 TABLET | Refills: 5 | Status: SHIPPED | OUTPATIENT
Start: 2024-08-01

## 2024-08-01 RX ORDER — DOXYCYCLINE 100 MG/1
100 CAPSULE ORAL 2 TIMES DAILY
Qty: 60 CAPSULE | Refills: 2 | Status: SHIPPED | OUTPATIENT
Start: 2024-08-01 | End: 2024-10-30

## 2024-08-01 NOTE — PROGRESS NOTES
"TELEMEDICINE VIRTUAL VIDEO VISIT  8/1/24  4:00 PM CDT    Visit Type: Audiovisual    Patient's Location: Betty represents that they are located within the state of [place].    History of Present Illness    Betty presents today for medication follow-up and to discuss multiple health concerns.    She reports recent stressful life events including her son's hospitalization with COVID and RSV, and her grandmother's passing after entering hospice. These events have been emotionally taxing.    She reports ongoing issues with inflammatory polyarthritis, primarily affecting her major joints (wrists, ankles, and elbows). She experiences morning stiffness, having to "uncurl" herself slowly when getting out of bed. She manages symptoms with OTC Aleve. She has been unable to schedule a rheumatologist appointment due to insurance limitations and lack of providers accepting new Medicaid patients.    She struggles with anxiety and depression. Anxiety attacks occur when managing multiple responsibilities simultaneously. Depression symptoms are currently less prominent. She uses Xanax as needed (typically half a tablet) and CBD oil preventively for anxiety management. She takes fluoxetine 40 mg daily but reports GI side effects including queasiness and a feeling of impending diarrhea. She previously tried Lexapro, which also caused queasiness. She is open to alternative medications to address these side effects while managing her symptoms.    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 Accommodations typically include extra time on assignments or one-on-one meetings with professors, discussed on a semester basis.    She reports pain primarily in wrists, ankles, and elbows, with occasional pain in hands and feet. Morning stiffness is present. She manages pain with extra strength Tylenol and Aleve. She experienced more " frequent discomfort in hands and feet during pregnancy.    Current medications include benzoyl peroxide 10% gel and Retin-A 0.05% cream for acne, Xanax for anxiety (as needed), CBD oil (preventive for panic attacks), and fluoxetine 40mg daily (causing stomach upset).    She is currently enrolled in an online doctoral program.    Her grandmother, who had been living with the family, recently passed away after a brief period in hospice.         Review of Systems   Constitutional:  Negative for activity change and unexpected weight change.   HENT:  Negative for hearing loss, rhinorrhea and trouble swallowing.    Eyes:  Negative for discharge and visual disturbance.   Respiratory:  Negative for chest tightness and wheezing.    Cardiovascular:  Negative for chest pain and palpitations.   Gastrointestinal:  Negative for blood in stool, constipation, diarrhea and vomiting.   Endocrine: Negative for polydipsia and polyuria.   Genitourinary:  Negative for difficulty urinating, dysuria and hematuria.   Musculoskeletal:  Positive for arthralgias. Negative for neck pain.   Psychiatric/Behavioral:  Positive for dysphoric mood.        Assessment & Plan     Assessed anxiety as primary concern over depression   Will adjust antidepressant regimen to address GI side effects and improve anxiety control   Planned to reduce fluoxetine dose and add buspirone for anxiety management   Considered patient's history with escitalopram and current fluoxetine side effects in treatment decision   Opted for gradual titration of buspirone to ensure tolerability  M06.9 RHEUMATOID ARTHRITIS, UNSPECIFIED:   Monitored patient's reported pain in major joints, including wrists, ankles, and elbows, as well as morning stiffness and difficulty uncurling fingers.   Evaluated the patient's inflammatory polyarthritis and chronic pain.   Attempted to refer the patient to a rheumatologist, but the patient has been unable to schedule an appointment due to  insurance limitations.   Noted that the patient is currently using OTC analgesics (acetaminophen and naproxen) for symptom management.  L70.0 ACNE VULGARIS:   Refilled benzoyl peroxide 10% gel and tretinoin (Retin-A) 0.05% cream for acne treatment.  R11.2 NAUSEA WITH VOMITING, UNSPECIFIED:   Monitored patient's reported queasiness and upset stomach associated with current antidepressant medication.   Assessed that antidepressants can cause GI disturbances and discussed alternative options with the patient.   Explained that fluoxetine has a long half-life, so queasiness may take time to subside after dose reduction.   Proposed reducing fluoxetine dose and adding buspirone to potentially mitigate side effects while maintaining efficacy for anxiety and depression.  F32.9 MAJOR DEPRESSIVE DISORDER, SINGLE EPISODE, UNSPECIFIED:   Evaluated patient's report that anxiety is currently more problematic than depression.   Decreased fluoxetine from 40 mg to 20 mg daily.  F41.1 GENERALIZED ANXIETY DISORDER:   Monitored patient's use of CBD oil for panic attack prevention and alprazolam for acute anxiety management.   Evaluated patient's report of anxiety as the primary current mental health concern, exacerbated by recent stressful life events.   Discussed buspirone's effectiveness for anxiety and its non-habit-forming nature.   Prescribed buspirone 15 mg tablets with the following regimen: Take 7.5 mg twice daily for 1 week, then increase to 7.5 mg 3 times daily for 1 week, then increase to 15 mg twice daily.   Advised that the patient may take 1 dose in the morning and 2 doses at bedtime instead of 3 times daily if preferred.   Instructed to adjust dosage as needed based on tolerability.   Continued alprazolam as previously prescribed.  Z79.899 OTHER LONG TERM (CURRENT) DRUG THERAPY:   Monitored patient's current use of multiple medications, including fluoxetine, alprazolam, benzoyl peroxide, tretinoin, and iron supplements.    Reviewed current medications and discussed changes to improve efficacy and reduce side effects.   Adjusted medication regimen: reduced fluoxetine from 40 mg to 20 mg daily, added buspirone for anxiety, refilled acne medications (benzoyl peroxide and tretinoin), and continued alprazolam as needed.   Scheduled follow up in 2-3 months to assess medication efficacy and tolerability.   Instructed staff to reach out to schedule the follow-up visit.   Advised the patient to contact the office before end of day to receive letter for [employer] [employer].       1. Generalized anxiety disorder with panic attacks  -     ALPRAZolam (XANAX) 0.25 MG tablet; Take 1 tablet (0.25 mg total) by mouth daily as needed for Anxiety.  Dispense: 30 tablet; Refill: 5  -     FLUoxetine 20 MG capsule; Take 1 capsule (20 mg total) by mouth once daily.  Dispense: 90 capsule; Refill: 3  -     busPIRone (BUSPAR) 30 MG Tab; Take one-half tablet (15 mg) by mouth twice daily for one week. Then increase to one-half tablet three times a day for one week. Then increase dose to 1 tablet (30 mg) twice daily.  Dispense: 60 tablet; Refill: 2    2. Acne vulgaris  -     benzoyl peroxide 10% 10 % gel; Apply to affected areas as directed.  Dispense: 45 g; Refill: 2  -     doxycycline (VIBRAMYCIN) 100 MG Cap; Take 1 capsule (100 mg total) by mouth 2 (two) times daily.  Dispense: 60 capsule; Refill: 2  -     tretinoin (RETIN-A) 0.05 % cream; Apply to affected area nightly  Dispense: 45 g; Refill: 2    3. Iron deficiency anemia secondary to inadequate dietary iron intake  -     ferrous sulfate (FEOSOL) 325 mg (65 mg iron) Tab tablet; Take 2 tablets (650 mg total) by mouth every other day.  Dispense: 90 tablet; Refill: 3    4. Recurrent mild major depressive disorder with anxiety  -     FLUoxetine 20 MG capsule; Take 1 capsule (20 mg total) by mouth once daily.  Dispense: 90 capsule; Refill: 3    5. FREDY positive  Overview:  See [facility]  Rheumatology notes from 2023.      6. Inflammatory polyarthritis  Overview:  See [facility] Rheumatology notes from 2023.      No other significant complaints or concerns were reported.  Today's visit involved the intricate management of episodic problem(s) and the ongoing care for the patient's serious or complex condition(s) listed above, reflecting the inherent complexity of providing longitudinal, comprehensive evaluation and management as the central hub for the patient's primary care services.  There were no vitals filed for this visit.  Physical Exam    Constitutional: No acute distress. Normal appearance. Not ill-appearing.  Pulmonary: Pulmonary effort is normal. No respiratory distress.  Skin: Skin is not jaundiced.  Neurological: Alert. Mental status is at baseline.  Psychiatric: Mood normal. Behavior normal. Thought content normal.         This note was generated with the assistance of ambient listening technology. Verbal consent was obtained by the patient and accompanying visitor(s) for the recording of patient appointment to facilitate this note. I attest to having reviewed and edited the generated note for accuracy, though some syntax or spelling errors may persist. Please contact the author of this note for any clarification.    I spent a total of 48 minutes today evaluating and managing this patient for this encounter.  This includes face to face time and non-face to face time preparing to see the patient (eg, review of tests), obtaining and/or reviewing separately obtained history, documenting clinical information in the electronic or other health record, independently interpreting results and communicating results to the patient/family/caregiver, or care coordinator. This time was exclusive of any separately billable procedures for this patient and exclusive of time spent treating any other patient.    Documentation entered by me for this encounter may have been done in part using  "speech-recognition technology. Although I have made an effort to ensure accuracy, "sound like" errors may exist and should be interpreted in context.    Each patient to whom medical services are provided by telemedicine is: (1) informed of the relationship between the physician and patient and the respective role of any other health care provider with respect to management of the patient; and (2) notified that he or she may decline to receive medical services by telemedicine and may withdraw from such care at any time.     2024    To Whom It May Concern,    I am writing this letter on behalf of my patient, Betty Felipe ( 1985), who has been under my care since 2024. She is currently a  at your institution, taking online classes, and is requesting reasonable accommodations under the Americans with Disabilities Act (ADA). I am providing this documentation to support her request based on her diagnosed medical conditions and their impact on her academic performance.    1. Primary Diagnoses:     - Generalized Anxiety Disorder with Panic Attacks     - Recurrent Mild Major Depressive Disorder with Anxiety       Secondary Diagnosis:     - Inflammatory Polyarthritis    2. Professional Relationship with the Student:     I have been treating Betty since 2024. During this time, she has had multiple appointments with me, including on 24, 24, and today, 24. The treatment plan has included both pharmacological and non-pharmacological interventions aimed at managing her symptoms. We have discussed her academic challenges and the need for accommodations to help her succeed in her studies.    3. Relevant Medical or Mental Health Diagnoses:     Based on my clinical assessment and ongoing treatment, I confirm that Betty's diagnoses of Generalized Anxiety Disorder with Panic Attacks, Recurrent Mild Major Depressive Disorder with Anxiety, and Inflammatory Polyarthritis " significantly impact her daily functioning and academic performance. These conditions rise to the level of disability as defined under the ADA.    4. Impact on Academic Performance:     Betty's disabilities create significant barriers to her full access and participation in academic courses compared to her peers. The symptoms associated with her anxiety and depressive disorders, such as severe brain fog, difficulty concentrating, and frequent panic attacks, substantially limit her ability to process and comprehend information quickly. This results in needing more time to complete assignments and exams. Additionally, the physical discomfort and limitations caused by her inflammatory polyarthritis further exacerbate her difficulties in maintaining focus and completing tasks efficiently.       The functional limitations imposed by these conditions go beyond the typical nervousness or stress that most students experience. The combination of her mental health challenges and chronic pain substantially impairs her day-to-day academic interactions and performance. Without reasonable accommodations, Betty's ability to succeed in her graduate program is significantly compromised.    In light of these factors, I strongly recommend that Betty be granted the necessary academic accommodations to ensure she has an equal opportunity to succeed in her educational pursuits.    Please extend to Betty all due courtesy, consideration, and accommodation.    Sincerely,     LATOYA Duarte MD, FAAFP  [place] License: [id number]

## 2024-08-01 NOTE — LETTER
2024    To Whom It May Concern,    I am writing this letter on behalf of my patient, Betty Felipe ( 1985), who has been under my care since 2024. She is currently a  at your institution, taking online classes, and is requesting reasonable accommodations under the Americans with Disabilities Act (ADA). I am providing this documentation to support her request based on her diagnosed medical conditions and their impact on her academic performance.    1. Primary Diagnoses:     - Generalized Anxiety Disorder with Panic Attacks     - Recurrent Mild Major Depressive Disorder with Anxiety       Secondary Diagnosis:     - Inflammatory Polyarthritis    2. Professional Relationship with the Student:     I have been treating Betty since 2024. During this time, she has had multiple appointments with me, including on 24, 24, and today, 24. The treatment plan has included both pharmacological and non-pharmacological interventions aimed at managing her symptoms. We have discussed her academic challenges and the need for accommodations to help her succeed in her studies.    3. Relevant Medical or Mental Health Diagnoses:     Based on my clinical assessment and ongoing treatment, I confirm that Betty's diagnoses of Generalized Anxiety Disorder with Panic Attacks, Recurrent Mild Major Depressive Disorder with Anxiety, and Inflammatory Polyarthritis significantly impact her daily functioning and academic performance. These conditions rise to the level of disability as defined under the ADA.    4. Impact on Academic Performance:     Betty's disabilities create significant barriers to her full access and participation in academic courses compared to her peers. The symptoms associated with her anxiety and depressive disorders, such as severe brain fog, difficulty concentrating, and frequent panic attacks, substantially limit her ability to process and comprehend  information quickly. This results in needing more time to complete assignments and exams. Additionally, the physical discomfort and limitations caused by her inflammatory polyarthritis further exacerbate her difficulties in maintaining focus and completing tasks efficiently.      ...continued on page 2  continued from page 1...       The functional limitations imposed by these conditions go beyond the typical nervousness or stress that most students experience. The combination of her mental health challenges and chronic pain substantially impairs her day-to-day academic interactions and performance. Without reasonable accommodations, Betty's ability to succeed in her graduate program is significantly compromised.    In light of these factors, I strongly recommend that Betty be granted the necessary academic accommodations to ensure she has an equal opportunity to succeed in her educational pursuits.    Please extend to Betty all due courtesy, consideration, and accommodation.    Sincerely,     LATOYA Duarte MD, FAAFP  [place] License: [id number]

## 2024-09-17 ENCOUNTER — OFFICE VISIT (OUTPATIENT)
Dept: OBSTETRICS AND GYNECOLOGY | Facility: CLINIC | Age: 39
End: 2024-09-17
Payer: MEDICAID

## 2024-09-17 VITALS
SYSTOLIC BLOOD PRESSURE: 106 MMHG | HEIGHT: 61 IN | BODY MASS INDEX: 24.31 KG/M2 | WEIGHT: 128.75 LBS | DIASTOLIC BLOOD PRESSURE: 80 MMHG

## 2024-09-17 DIAGNOSIS — Z01.419 ENCOUNTER FOR ANNUAL ROUTINE GYNECOLOGICAL EXAMINATION: Primary | ICD-10-CM

## 2024-09-17 DIAGNOSIS — Z12.31 ENCOUNTER FOR SCREENING MAMMOGRAM FOR MALIGNANT NEOPLASM OF BREAST: ICD-10-CM

## 2024-09-17 PROCEDURE — 99395 PREV VISIT EST AGE 18-39: CPT | Mod: S$PBB,,,

## 2024-09-17 PROCEDURE — 1159F MED LIST DOCD IN RCRD: CPT | Mod: CPTII,,,

## 2024-09-17 PROCEDURE — 99999 PR PBB SHADOW E&M-EST. PATIENT-LVL III: CPT | Mod: PBBFAC,,,

## 2024-09-17 PROCEDURE — 99213 OFFICE O/P EST LOW 20 MIN: CPT | Mod: PBBFAC

## 2024-09-17 PROCEDURE — 3008F BODY MASS INDEX DOCD: CPT | Mod: CPTII,,,

## 2024-09-17 PROCEDURE — 3074F SYST BP LT 130 MM HG: CPT | Mod: CPTII,,,

## 2024-09-17 PROCEDURE — 1160F RVW MEDS BY RX/DR IN RCRD: CPT | Mod: CPTII,,,

## 2024-09-17 PROCEDURE — 3079F DIAST BP 80-89 MM HG: CPT | Mod: CPTII,,,

## 2024-09-17 NOTE — PROGRESS NOTES
Subjective:       Patient ID: Betty Felipe is a 39 y.o. female.    Chief Complaint:  Well Woman      History of Present Illness  HPI  Annual Exam-Premenopausal  Patient presents for annual exam. The patient has no complaints today. The patient is not currently sexually active. GYN screening history: last pap: approximate date 23 and was normal. The patient wears seatbelts: yes. The patient participates in regular exercise: no. Has the patient ever been transfused or tattooed?: not asked. The patient reports that there is not domestic violence in her life. Menses are regular with periods lasting 5 days.  Denies excessive bleeding or cramping.          GYN & OB History  Patient's last menstrual period was 2024.   Date of Last Pap: 2/3/2023    OB History    Para Term  AB Living   2 1 1     1   SAB IAB Ectopic Multiple Live Births         0 1      # Outcome Date GA Lbr Soham/2nd Weight Sex Type Anes PTL Lv   2             1 Term 23 41w0d  3.82 kg (8 lb 6.8 oz) M CS-LTranv EPI, Spinal N JUANY      Complications: Fetal Intolerance, Failure to progress in labor       Review of Systems  Review of Systems   Constitutional: Negative.    HENT: Negative.     Eyes: Negative.    Respiratory: Negative.     Cardiovascular: Negative.    Gastrointestinal: Negative.    Genitourinary: Negative.    Musculoskeletal: Negative.    Integumentary:  Negative.   Neurological: Negative.    Hematological: Negative.    Psychiatric/Behavioral: Negative.     All other systems reviewed and are negative.  Breast: negative.            Objective:      Physical Exam:   Constitutional: She is oriented to person, place, and time. She appears well-developed and well-nourished.    HENT:   Head: Normocephalic and atraumatic.   Nose: Nose normal.    Eyes: Pupils are equal, round, and reactive to light. Conjunctivae and EOM are normal.     Cardiovascular:  Normal rate and regular rhythm.             Pulmonary/Chest: Effort  normal. She has no decreased breath sounds. She has no rhonchi. Right breast exhibits no inverted nipple, no mass, no nipple discharge, no tenderness and no bleeding. Left breast exhibits no inverted nipple, no mass, no nipple discharge, no tenderness and no bleeding. Breasts are symmetrical.        Abdominal: Soft. There is no abdominal tenderness.     Genitourinary:    Inguinal canal, vagina, uterus, right adnexa, left adnexa and rectum normal.      Pelvic exam was performed with patient supine.   The external female genitalia was normal.   No external genitalia lesions identified,Genitalia hair distrobution normal .     Labial bartholins normal.Cervix is normal. Right adnexum displays no mass and no tenderness. Left adnexum displays no mass and no tenderness. No vaginal discharge, tenderness or bleeding in the vagina. Vagina was moist.Cervix exhibits no motion tenderness, no discharge and no tenderness.    pap smear not completedUerus contour normal  Uterus is not tender. Uterus size: 10 cm.Normal urethral meatus.          Musculoskeletal: Normal range of motion and moves all extremeties.       Neurological: She is alert and oriented to person, place, and time.    Skin: Skin is warm and dry.    Psychiatric: She has a normal mood and affect. Her speech is normal and behavior is normal. Mood, judgment and thought content normal.             Assessment:        1. Encounter for annual routine gynecological examination    2. Encounter for screening mammogram for malignant neoplasm of breast               Plan:   Continue annual well woman exam.  Pap current, due 1/2026. Patient was counseled today on ASCCP screening guidelines (pap smear every 3 years in low risk patients) and recommendations for annual pelvic exams and clinical breast exams, yearly mammograms starting at age 40; and to see her PCP for other healthcare maintenance.      Diagnosis and orders this visit:  Encounter for annual routine gynecological  examination    Encounter for screening mammogram for malignant neoplasm of breast  -     Mammo Digital Screening Bilat; Future; Expected date: 06/10/2025      Kristan Matson, NP

## 2024-09-29 DIAGNOSIS — L70.0 ACNE VULGARIS: Chronic | ICD-10-CM

## 2024-09-29 DIAGNOSIS — F41.1 GENERALIZED ANXIETY DISORDER WITH PANIC ATTACKS: Chronic | ICD-10-CM

## 2024-09-29 DIAGNOSIS — D50.8 IRON DEFICIENCY ANEMIA SECONDARY TO INADEQUATE DIETARY IRON INTAKE: Chronic | ICD-10-CM

## 2024-09-29 DIAGNOSIS — F41.9 RECURRENT MILD MAJOR DEPRESSIVE DISORDER WITH ANXIETY: Chronic | ICD-10-CM

## 2024-09-29 DIAGNOSIS — F41.0 GENERALIZED ANXIETY DISORDER WITH PANIC ATTACKS: Chronic | ICD-10-CM

## 2024-09-29 DIAGNOSIS — F33.0 RECURRENT MILD MAJOR DEPRESSIVE DISORDER WITH ANXIETY: Chronic | ICD-10-CM

## 2024-09-30 NOTE — TELEPHONE ENCOUNTER
No care due was identified.  [facility] Embedded Care Due Messages. Reference number: [id number].   9/29/2024 7:38:56 PM CDT

## 2024-10-01 ENCOUNTER — TELEPHONE (OUTPATIENT)
Dept: INTERNAL MEDICINE | Facility: CLINIC | Age: 39
End: 2024-10-01
Payer: MEDICAID

## 2024-10-01 ENCOUNTER — PATIENT MESSAGE (OUTPATIENT)
Dept: INTERNAL MEDICINE | Facility: CLINIC | Age: 39
End: 2024-10-01
Payer: MEDICAID

## 2024-10-01 NOTE — TELEPHONE ENCOUNTER
----- Message from Nikoartieeliecer sent at 10/1/2024  3:28 PM CDT -----  Contact: [phone]  Type:  RX Refill Request    Who Called: SYLVESTER GAUTHIER [[id number]]  Refill or New Rx:REFILL  RX Name and Strength:#ALPRAZolam (XANAX) 0.25 MG tablet/ FLUoxetine 20 MG capsule  How is the patient currently taking it? (ex. 1XDay):  Is this a 30 day or 90 day RX:  Preferred Pharmacy with phone number: [phone] Fax: [phone]       Local or Mail Order:local  Ordering Provider:CAMILA  Would the patient rather a call back or a response via [facility]? Call back  Best Call Back Number:[phone]  Additional Information: [id number]... patient is COMPLETELY OUT          [facility] [place] [id number]  [place]  [place] [address]  Phone: [phone] Fax: [phone]

## 2024-10-02 ENCOUNTER — PATIENT MESSAGE (OUTPATIENT)
Dept: INTERNAL MEDICINE | Facility: CLINIC | Age: 39
End: 2024-10-02
Payer: MEDICAID

## 2024-10-02 RX ORDER — FLUOXETINE HYDROCHLORIDE 20 MG/1
20 CAPSULE ORAL DAILY
Qty: 90 CAPSULE | Refills: 3 | OUTPATIENT
Start: 2024-10-02

## 2024-10-02 RX ORDER — DOXYCYCLINE 100 MG/1
100 CAPSULE ORAL 2 TIMES DAILY
Qty: 60 CAPSULE | Refills: 2 | OUTPATIENT
Start: 2024-10-02 | End: 2024-12-31

## 2024-10-02 RX ORDER — FERROUS SULFATE 325(65) MG
650 TABLET ORAL EVERY OTHER DAY
Qty: 90 TABLET | Refills: 3 | OUTPATIENT
Start: 2024-10-02 | End: 2025-10-02

## 2024-10-02 RX ORDER — ALPRAZOLAM 0.25 MG/1
0.25 TABLET ORAL DAILY PRN
Qty: 30 TABLET | Refills: 5 | OUTPATIENT
Start: 2024-10-02

## 2025-01-14 ENCOUNTER — OFFICE VISIT (OUTPATIENT)
Dept: INTERNAL MEDICINE | Facility: CLINIC | Age: 40
End: 2025-01-14
Payer: MEDICAID

## 2025-01-14 ENCOUNTER — PATIENT MESSAGE (OUTPATIENT)
Dept: INTERNAL MEDICINE | Facility: CLINIC | Age: 40
End: 2025-01-14

## 2025-01-14 DIAGNOSIS — F41.1 GENERALIZED ANXIETY DISORDER WITH PANIC ATTACKS: Primary | Chronic | ICD-10-CM

## 2025-01-14 DIAGNOSIS — D50.8 IRON DEFICIENCY ANEMIA SECONDARY TO INADEQUATE DIETARY IRON INTAKE: Chronic | ICD-10-CM

## 2025-01-14 DIAGNOSIS — F33.0 RECURRENT MILD MAJOR DEPRESSIVE DISORDER WITH ANXIETY: Chronic | ICD-10-CM

## 2025-01-14 DIAGNOSIS — F41.0 GENERALIZED ANXIETY DISORDER WITH PANIC ATTACKS: Primary | Chronic | ICD-10-CM

## 2025-01-14 DIAGNOSIS — F41.9 RECURRENT MILD MAJOR DEPRESSIVE DISORDER WITH ANXIETY: Chronic | ICD-10-CM

## 2025-01-14 DIAGNOSIS — M06.4 INFLAMMATORY POLYARTHRITIS: Chronic | ICD-10-CM

## 2025-01-14 RX ORDER — FERROUS SULFATE 325(65) MG
650 TABLET ORAL EVERY OTHER DAY
Qty: 90 TABLET | Refills: 3 | Status: SHIPPED | OUTPATIENT
Start: 2025-01-14 | End: 2026-01-14

## 2025-01-14 RX ORDER — MELOXICAM 15 MG/1
15 TABLET ORAL DAILY PRN
Qty: 30 TABLET | Refills: 5 | Status: SHIPPED | OUTPATIENT
Start: 2025-01-14

## 2025-01-14 RX ORDER — ALPRAZOLAM 0.25 MG/1
0.25 TABLET ORAL DAILY PRN
Qty: 30 TABLET | Refills: 5 | Status: SHIPPED | OUTPATIENT
Start: 2025-01-14

## 2025-01-14 RX ORDER — FLUOXETINE HYDROCHLORIDE 20 MG/1
20 CAPSULE ORAL DAILY
Qty: 90 CAPSULE | Refills: 3 | Status: SHIPPED | OUTPATIENT
Start: 2025-01-14

## 2025-01-14 NOTE — PROGRESS NOTES
TELEMEDICINE VIRTUAL VIDEO VISIT  1/14/25  4:00 PM CST    Visit Type: Audiovisual    Patient's Location: Betty represents that they are located within the [facility].    CHIEF COMPLAINT: Follow-up     She reports that the fluoxetine is working well for her depression and anxiety, needing the alprazolam as needed for breakthrough anxiety, on average less than once daily.      She continues to take the iron for her iron deficiency anemia and is due for follow-up labs.      She continues to report diffuse arthralgias. Notes from [facility] from 2023 document inflammatory polyarthritis.  Recent labs did not show evidence of autoimmune/rheumatologic conditions. She says that she has been unsuccessful in scheduling an appointment with a rheumatologist. She says that [facility] is willing to accept her, but only if she is referred from within their system. We discuss strategies to help her overcome barriers to receiving care, including, if needed, scheduling an appointment with [facility] for the purpose of getting a referral to their system rheumatology, given the shortage of available rheumatology options.       Review of Systems   Constitutional:  Negative for fever.   Cardiovascular:  Negative for chest pain.   Genitourinary:  Negative for bladder incontinence, dysuria and hematuria.   Musculoskeletal:  Positive for arthralgias.   Neurological:  Negative for numbness and headaches.     1. Generalized anxiety disorder with panic attacks  -     ALPRAZolam (XANAX) 0.25 MG tablet; Take 1 tablet (0.25 mg total) by mouth daily as needed for Anxiety.  Dispense: 30 tablet; Refill: 5  -     FLUoxetine 20 MG capsule; Take 1 capsule (20 mg total) by mouth once daily.  Dispense: 90 capsule; Refill: 3    2. Recurrent mild major depressive disorder with anxiety  -     FLUoxetine 20 MG capsule; Take 1 capsule (20 mg total) by mouth once daily.   Dispense: 90 capsule; Refill: 3    3. Inflammatory polyarthritis  Overview:  See [facility] notes from 2023.    Orders:  -     meloxicam (MOBIC) 15 MG tablet; Take 1 tablet (15 mg total) by mouth daily as needed (for musculoskeletal pain).  Dispense: 30 tablet; Refill: 5    4. Iron deficiency anemia secondary to inadequate dietary iron intake  -     ferrous sulfate (FEOSOL) 325 mg (65 mg iron) Tab tablet; Take 2 tablets (650 mg total) by mouth every other day.  Dispense: 90 tablet; Refill: 3  -     CBC Auto Differential; Future; Expected date: 01/14/2025  -     Ferritin; Future; Expected date: 01/14/2025  -     Iron and TIBC; Future; Expected date: 01/14/2025    No other significant complaints or concerns were reported.  Today's visit involved the intricate management of episodic problem(s) and the ongoing care for the patient's serious or complex condition(s) listed above, reflecting the inherent complexity of providing longitudinal, comprehensive evaluation and management as the central hub for the patient's primary care services.  [facility] Controlled Prescription Drug Monitoring database was queried and showed no activity to suggest abuse, diversion, or other improper use of prescription medications.   Refer to Patient Portal Message and/or After Visit Summary for additional education/instructions provided.  There were no vitals filed for this visit.  PHYSICAL EXAM:  GENERAL APPEARANCE:  - Alert and grossly oriented.  - No apparent distress, breathing comfortably.     EYES:  - Sclera without icterus.     EARS, NOSE, AND THROAT:  - No visible abnormalities.     RESPIRATORY:  - No respiratory distress.  - No audible wheezing or cough.     PSYCHIATRIC:  - Mood and affect appropriate; behavior cooperative.    I spent a total of 22 minutes today evaluating and managing this patient for this encounter.  This includes face to face time and non-face to face time preparing to see  "the patient (eg, review of tests), obtaining and/or reviewing separately obtained history, documenting clinical information in the electronic or other health record, independently interpreting results and communicating results to the patient/family/caregiver, or care coordinator.  This time was exclusive of any separately billable procedures for this patient and exclusive of time spent treating any other patient.    Documentation entered by me for this encounter may have been done in part using speech-recognition technology. Although I have made an effort to ensure accuracy, "sound like" errors may exist and should be interpreted in context.    Each patient to whom medical services are provided by telemedicine is: (1) informed of the relationship between the physician and patient and the respective role of any other health care provider with respect to management of the patient; and (2) notified that he or she may decline to receive medical services by telemedicine and may withdraw from such care at any time.    FOR FOLLOW-UP AT NEXT APPOINTMENT  @[email or web address]: Refills.  "

## 2025-01-14 NOTE — ASSESSMENT & PLAN NOTE
Lab Results   Component Value Date    HGB 11.8 (L) 05/03/2024    HGB 11.0 (L) 09/11/2023    HGB 10.8 (L) 06/20/2023    HCT 38.9 05/03/2024    HCT 33.4 (L) 09/11/2023    HCT 34.0 (L) 06/20/2023     Lab Results   Component Value Date    FERRITIN 53 05/03/2024    IRON 66 05/03/2024    TRANSFERRIN 242 05/03/2024    TIBC 358 05/03/2024    FESATURATED 18 (L) 05/03/2024

## 2025-01-25 ENCOUNTER — TELEPHONE (OUTPATIENT)
Dept: INTERNAL MEDICINE | Facility: CLINIC | Age: 40
End: 2025-01-25
Payer: MEDICAID

## 2025-01-25 ENCOUNTER — PATIENT MESSAGE (OUTPATIENT)
Dept: INTERNAL MEDICINE | Facility: CLINIC | Age: 40
End: 2025-01-25
Payer: MEDICAID

## 2025-02-21 ENCOUNTER — PATIENT MESSAGE (OUTPATIENT)
Dept: INTERNAL MEDICINE | Facility: CLINIC | Age: 40
End: 2025-02-21
Payer: MEDICAID

## 2025-03-12 ENCOUNTER — HOSPITAL ENCOUNTER (EMERGENCY)
Facility: HOSPITAL | Age: 40
Discharge: HOME | End: 2025-03-12
Attending: EMERGENCY MEDICINE
Payer: MEDICAID

## 2025-03-12 VITALS
TEMPERATURE: 99 F | SYSTOLIC BLOOD PRESSURE: 110 MMHG | WEIGHT: 131 LBS | BODY MASS INDEX: 24.75 KG/M2 | OXYGEN SATURATION: 100 % | HEART RATE: 62 BPM | RESPIRATION RATE: 16 BRPM | DIASTOLIC BLOOD PRESSURE: 60 MMHG

## 2025-03-12 DIAGNOSIS — N20.0 KIDNEY STONE ON RIGHT SIDE: Primary | ICD-10-CM

## 2025-03-12 LAB
ALBUMIN SERPL BCP-MCNC: 4.8 G/DL (ref 3.5–5.2)
ALP SERPL-CCNC: 82 U/L (ref 40–150)
ALT SERPL W/O P-5'-P-CCNC: 14 U/L (ref 10–44)
ANION GAP SERPL CALC-SCNC: 11 MMOL/L (ref 8–16)
AST SERPL-CCNC: 20 U/L (ref 10–40)
B-HCG UR QL: NEGATIVE
BACTERIA #/AREA URNS HPF: ABNORMAL /HPF
BASOPHILS # BLD AUTO: 0.02 K/UL (ref 0–0.2)
BASOPHILS NFR BLD: 0.2 % (ref 0–1.9)
BILIRUB SERPL-MCNC: 0.7 MG/DL (ref 0.1–1)
BILIRUB UR QL STRIP: NEGATIVE
BUN SERPL-MCNC: 14 MG/DL (ref 6–20)
CALCIUM SERPL-MCNC: 10 MG/DL (ref 8.7–10.5)
CHLORIDE SERPL-SCNC: 106 MMOL/L (ref 95–110)
CLARITY UR: CLEAR
CO2 SERPL-SCNC: 20 MMOL/L (ref 23–29)
COLOR UR: YELLOW
CREAT SERPL-MCNC: 1 MG/DL (ref 0.5–1.4)
DIFFERENTIAL METHOD BLD: ABNORMAL
EOSINOPHIL # BLD AUTO: 0 K/UL (ref 0–0.5)
EOSINOPHIL NFR BLD: 0 % (ref 0–8)
ERYTHROCYTE [DISTWIDTH] IN BLOOD BY AUTOMATED COUNT: 14.3 % (ref 11.5–14.5)
EST. GFR  (NO RACE VARIABLE): >60 ML/MIN/1.73 M^2
GLUCOSE SERPL-MCNC: 109 MG/DL (ref 70–110)
GLUCOSE UR QL STRIP: NEGATIVE
HCT VFR BLD AUTO: 37.8 % (ref 37–48.5)
HCV AB SERPL QL IA: NEGATIVE
HEP C VIRUS HOLD SPECIMEN: NORMAL
HGB BLD-MCNC: 12.3 G/DL (ref 12–16)
HGB UR QL STRIP: ABNORMAL
HIV 1+2 AB+HIV1 P24 AG SERPL QL IA: NEGATIVE
HYALINE CASTS #/AREA URNS LPF: 0 /LPF
IMM GRANULOCYTES # BLD AUTO: 0.03 K/UL (ref 0–0.04)
IMM GRANULOCYTES NFR BLD AUTO: 0.3 % (ref 0–0.5)
KETONES UR QL STRIP: ABNORMAL
LEUKOCYTE ESTERASE UR QL STRIP: NEGATIVE
LIPASE SERPL-CCNC: 14 U/L (ref 4–60)
LYMPHOCYTES # BLD AUTO: 0.5 K/UL (ref 1–4.8)
LYMPHOCYTES NFR BLD: 4.2 % (ref 18–48)
MCH RBC QN AUTO: 24.8 PG (ref 27–31)
MCHC RBC AUTO-ENTMCNC: 32.5 G/DL (ref 32–36)
MCV RBC AUTO: 76 FL (ref 82–98)
MICROSCOPIC COMMENT: ABNORMAL
MONOCYTES # BLD AUTO: 0.2 K/UL (ref 0.3–1)
MONOCYTES NFR BLD: 1.9 % (ref 4–15)
NEUTROPHILS # BLD AUTO: 10.7 K/UL (ref 1.8–7.7)
NEUTROPHILS NFR BLD: 93.4 % (ref 38–73)
NITRITE UR QL STRIP: NEGATIVE
NRBC BLD-RTO: 0 /100 WBC
PH UR STRIP: 6 [PH] (ref 5–8)
PLATELET # BLD AUTO: 399 K/UL (ref 150–450)
PMV BLD AUTO: 10.1 FL (ref 9.2–12.9)
POTASSIUM SERPL-SCNC: 4.3 MMOL/L (ref 3.5–5.1)
PROT SERPL-MCNC: 8.3 G/DL (ref 6–8.4)
PROT UR QL STRIP: ABNORMAL
RBC # BLD AUTO: 4.96 M/UL (ref 4–5.4)
RBC #/AREA URNS HPF: 6 /HPF (ref 0–4)
SODIUM SERPL-SCNC: 137 MMOL/L (ref 136–145)
SP GR UR STRIP: >1.03 (ref 1–1.03)
SQUAMOUS #/AREA URNS HPF: 3 /HPF
URN SPEC COLLECT METH UR: ABNORMAL
UROBILINOGEN UR STRIP-ACNC: NEGATIVE EU/DL
WBC # BLD AUTO: 11.4 K/UL (ref 3.9–12.7)
WBC #/AREA URNS HPF: 4 /HPF (ref 0–5)

## 2025-03-12 PROCEDURE — 96361 HYDRATE IV INFUSION ADD-ON: CPT

## 2025-03-12 PROCEDURE — 83690 ASSAY OF LIPASE: CPT | Performed by: NURSE PRACTITIONER

## 2025-03-12 PROCEDURE — 81000 URINALYSIS NONAUTO W/SCOPE: CPT | Performed by: NURSE PRACTITIONER

## 2025-03-12 PROCEDURE — 87389 HIV-1 AG W/HIV-1&-2 AB AG IA: CPT | Performed by: NURSE PRACTITIONER

## 2025-03-12 PROCEDURE — 86803 HEPATITIS C AB TEST: CPT | Performed by: NURSE PRACTITIONER

## 2025-03-12 PROCEDURE — 25000003 PHARM REV CODE 250: Performed by: NURSE PRACTITIONER

## 2025-03-12 PROCEDURE — 63600175 PHARM REV CODE 636 W HCPCS: Mod: JZ,TB | Performed by: NURSE PRACTITIONER

## 2025-03-12 PROCEDURE — 96374 THER/PROPH/DIAG INJ IV PUSH: CPT

## 2025-03-12 PROCEDURE — 85025 COMPLETE CBC W/AUTO DIFF WBC: CPT | Performed by: NURSE PRACTITIONER

## 2025-03-12 PROCEDURE — 81025 URINE PREGNANCY TEST: CPT | Performed by: EMERGENCY MEDICINE

## 2025-03-12 PROCEDURE — 99285 EMERGENCY DEPT VISIT HI MDM: CPT | Mod: 25

## 2025-03-12 PROCEDURE — 80053 COMPREHEN METABOLIC PANEL: CPT | Performed by: NURSE PRACTITIONER

## 2025-03-12 RX ORDER — TAMSULOSIN HYDROCHLORIDE 0.4 MG/1
0.4 CAPSULE ORAL DAILY
Qty: 10 CAPSULE | Refills: 0 | Status: SHIPPED | OUTPATIENT
Start: 2025-03-12 | End: 2026-03-12

## 2025-03-12 RX ORDER — TAMSULOSIN HYDROCHLORIDE 0.4 MG/1
0.4 CAPSULE ORAL
Status: COMPLETED | OUTPATIENT
Start: 2025-03-12 | End: 2025-03-12

## 2025-03-12 RX ORDER — KETOROLAC TROMETHAMINE 10 MG/1
10 TABLET, FILM COATED ORAL 2 TIMES DAILY
Qty: 10 TABLET | Refills: 0 | Status: SHIPPED | OUTPATIENT
Start: 2025-03-12 | End: 2025-03-17

## 2025-03-12 RX ORDER — ONDANSETRON 4 MG/1
4 TABLET, FILM COATED ORAL EVERY 6 HOURS
Qty: 12 TABLET | Refills: 0 | Status: SHIPPED | OUTPATIENT
Start: 2025-03-12

## 2025-03-12 RX ORDER — KETOROLAC TROMETHAMINE 30 MG/ML
30 INJECTION, SOLUTION INTRAMUSCULAR; INTRAVENOUS
Status: COMPLETED | OUTPATIENT
Start: 2025-03-12 | End: 2025-03-12

## 2025-03-12 RX ORDER — ONDANSETRON 4 MG/1
4 TABLET, ORALLY DISINTEGRATING ORAL
Status: COMPLETED | OUTPATIENT
Start: 2025-03-12 | End: 2025-03-12

## 2025-03-12 RX ADMIN — ONDANSETRON 4 MG: 4 TABLET, ORALLY DISINTEGRATING ORAL at 09:03

## 2025-03-12 RX ADMIN — SODIUM CHLORIDE 1000 ML: 9 INJECTION, SOLUTION INTRAVENOUS at 10:03

## 2025-03-12 RX ADMIN — TAMSULOSIN HYDROCHLORIDE 0.4 MG: 0.4 CAPSULE ORAL at 10:03

## 2025-03-12 RX ADMIN — KETOROLAC TROMETHAMINE 30 MG: 30 INJECTION, SOLUTION INTRAMUSCULAR at 10:03

## 2025-03-12 NOTE — FIRST PROVIDER EVALUATION
Medical screening examination initiated.  I have conducted a focused provider triage encounter, findings are as follows:    Brief history of present illness:  Back pain and right lower quadrant pain with associated nausea    Vitals:    03/12/25 1751   BP: 130/68   Pulse: 65   Resp: 20   Temp: 98.5 °F (36.9 °C)   TempSrc: Oral   SpO2: 100%   Weight: 59.4 kg (131 lb)       Pertinent physical exam:  Vital signs stable    Brief workup plan:  Labs, further evaluation    Preliminary workup initiated; this workup will be continued and followed by the physician or advanced practice provider that is assigned to the patient when roomed.

## 2025-03-12 NOTE — Clinical Note
"Betty Santiagokang Felipe was seen and treated in our emergency department on 3/12/2025.  She may return to work on 03/14/2025.       If you have any questions or concerns, please don't hesitate to call.      Sreedhar Duff NP"

## 2025-03-14 NOTE — ED PROVIDER NOTES
Encounter Date: 3/12/2025       History     Chief Complaint   Patient presents with    Back Pain     Lower right back pain radiating down right leg since this am, pain increases with movement. Also c/o lower right quadrant abdominal pain, + nausea/vomiting and difficulty with urination since this am.      Patient complains of right lower quadrant abdominal discomfort and nausea for 1 day        Review of patient's allergies indicates:   Allergen Reactions    Hay fever and allergy relief Itching and Shortness Of Breath     Past Medical History:   Diagnosis Date    Post-term pregnancy, 40-42 weeks of gestation 2023     Past Surgical History:   Procedure Laterality Date     SECTION N/A 2023    Procedure:  SECTION;  Surgeon: Ramona Madera MD;  Location: [facility] L&D;  Service: OB/GYN;  Laterality: N/A;    DENTAL SURGERY       Family History   Problem Relation Name Age of Onset    Breast cancer Neg Hx      Cancer Neg Hx      Colon cancer Neg Hx      Ovarian cancer Neg Hx       Social History[1]  Review of Systems   Constitutional:  Negative for fever.   HENT:  Negative for sore throat.    Respiratory:  Negative for shortness of breath.    Cardiovascular:  Negative for chest pain.   Gastrointestinal:  Negative for nausea.   Genitourinary:  Negative for dysuria.   Musculoskeletal:  Negative for back pain.   Skin:  Negative for rash.   Neurological:  Negative for weakness.   Hematological:  Does not bruise/bleed easily.       Physical Exam     Initial Vitals [25 1751]   BP Pulse Resp Temp SpO2   130/68 65 20 98.5 °F (36.9 °C) 100 %      MAP       --         Physical Exam    Nursing note and vitals reviewed.  Constitutional: She appears well-developed and well-nourished. She is not diaphoretic. She is active.  Non-toxic appearance. No distress.   HENT:   Head: Normocephalic and atraumatic.   Eyes: Conjunctivae are normal. Right eye exhibits no discharge. Left eye exhibits no discharge. No  scleral icterus.   Neck:   Normal range of motion.  Cardiovascular:  Normal rate, regular rhythm and intact distal pulses.           No murmur heard.  Pulmonary/Chest: Breath sounds normal. No respiratory distress. She has no wheezes.   Abdominal: She exhibits no distension.   Musculoskeletal:         General: No tenderness. Normal range of motion.      Cervical back: Normal range of motion.     Neurological: She is alert and oriented to person, place, and time. No cranial nerve deficit. GCS score is 15. GCS eye subscore is 4. GCS verbal subscore is 5. GCS motor subscore is 6.   Skin: Skin is warm and dry. Capillary refill takes less than 2 seconds. No rash noted.   Psychiatric: She has a normal mood and affect. Her behavior is normal. Judgment and thought content normal.         ED Course   Procedures  Labs Reviewed   CBC W/ AUTO DIFFERENTIAL - Abnormal       Result Value    WBC 11.40      RBC 4.96      Hemoglobin 12.3      Hematocrit 37.8      MCV 76 (*)     MCH 24.8 (*)     MCHC 32.5      RDW 14.3      Platelets 399      MPV 10.1      Immature Granulocytes 0.3      Gran # (ANC) 10.7 (*)     Immature Grans (Abs) 0.03      Lymph # 0.5 (*)     Mono # 0.2 (*)     Eos # 0.0      Baso # 0.02      nRBC 0      Gran % 93.4 (*)     Lymph % 4.2 (*)     Mono % 1.9 (*)     Eosinophil % 0.0      Basophil % 0.2      Differential Method Automated     COMPREHENSIVE METABOLIC PANEL - Abnormal    Sodium 137      Potassium 4.3      Chloride 106      CO2 20 (*)     Glucose 109      BUN 14      Creatinine 1.0      Calcium 10.0      Total Protein 8.3      Albumin 4.8      Total Bilirubin 0.7      Alkaline Phosphatase 82      AST 20      ALT 14      eGFR >60      Anion Gap 11     URINALYSIS, REFLEX TO URINE CULTURE - Abnormal    Specimen UA Urine, Clean Catch      Color, UA Yellow      Appearance, UA Clear      pH, UA 6.0      Specific Gravity, UA >1.030 (*)     Protein, UA 1+ (*)     Glucose, UA Negative      Ketones, UA 3+ (*)      Bilirubin (UA) Negative      Occult Blood UA 1+ (*)     Nitrite, UA Negative      Urobilinogen, UA Negative      Leukocytes, UA Negative      Narrative:     Specimen Source->Urine   URINALYSIS MICROSCOPIC - Abnormal    RBC, UA 6 (*)     WBC, UA 4      Bacteria Rare      Squam Epithel, UA 3      Hyaline Casts, UA 0      Microscopic Comment SEE COMMENT      Narrative:     Specimen Source->Urine   LIPASE    Lipase 14     PREGNANCY TEST, URINE RAPID   PREGNANCY TEST, URINE RAPID    Preg Test, Ur Negative      Narrative:     Specimen Source->Urine   HIV 1 / 2 ANTIBODY    HIV 1/2 Ag/Ab Negative      Narrative:     Release to patient->Immediate   HEPATITIS C ANTIBODY    Hepatitis C Ab Negative      Narrative:     Release to patient->Immediate   HEP C VIRUS HOLD SPECIMEN    HEP C Virus Hold Specimen Hold for HCV sendout      Narrative:     Release to patient->Immediate          Imaging Results              CT Renal Stone Study Abd Pelvis WO (Final result)  Result time 03/12/25 21:19:15   Procedure changed from CT Abdomen Pelvis  Without Contrast     Final result by Taran Jones MD (03/12/25 21:19:15)                   Impression:     Bilateral nephrolithiasis.  Right hydroureteronephrosis with 2 mm distal right ureteral/UVJ stone.    Left adnexal cyst.    Normal appendix.    Finalized on: 3/12/2025 9:19 PM By:  Taran Jones MD  [facility]# [id number]      2025-03-12 21:21:16.447     [facility]               Narrative:    EXAM:  CT RENAL STONE STUDY ABD PELVIS WO    CLINICAL HISTORY:  Right flank pain.    COMPARISON: None.    TECHNIQUE:  Limited noncontrast CT scan of the abdomen and pelvis.  All CT scans at [this location] are performed using dose modulation techniques as appropriate to a performed exam including the following: automated exposure control; adjustment of the mA and/or kV according to patient size (this includes techniques or standardized protocols for targeted exams where dose is matched to indication /  reason for exam; i.e. extremities or head); use of iterative reconstruction technique.    FINDINGS:  Lung bases are clear.    The liver and spleen appear normal.    The gallbladder is present.    The left kidney demonstrates a punctate 1 mm calcification in the midpole.    The right kidney is prominent with perinephric stranding and hydronephrosis.  There are at least 2 calcifications of the right kidney the larger 2 mm in size in the lower pole.  The right ureter is distended with periureteral stranding.  There is a 2 mm stone in the deep right pelvis anteriorly probably a distal right ureteral stone.    The uterus is present.    In the left adnexa there is a cystic area probably a left ovarian cyst 4.3 cm in size.    The pancreas is normal.    The bowel loops are normal.    No free fluid.    The aorta and IVC appear grossly normal.                                             Medications   ondansetron disintegrating tablet 4 mg (4 mg Oral Given 3/12/25 2112)   tamsulosin 24 hr capsule 0.4 mg (0.4 mg Oral Given 3/12/25 2249)   sodium chloride 0.9% bolus 1,000 mL 1,000 mL (0 mLs Intravenous Stopped 3/12/25 2338)   ketorolac injection 30 mg (30 mg Intravenous Given 3/12/25 2249)     Medical Decision Making  Differential diagnosis considered but not limited to; UTI, kidney stone    Amount and/or Complexity of Data Reviewed  Labs: ordered.  Radiology: ordered.    Risk  Prescription drug management.                                      Clinical Impression:  Final diagnoses:  [N20.0] Kidney stone on right side (Primary)          ED Disposition Condition    Discharge Stable          ED Prescriptions       Medication Sig Dispense Start Date End Date Auth. Provider    ketorolac (TORADOL) 10 mg tablet Take 1 tablet (10 mg total) by mouth 2 (two) times a day. for 5 days 10 tablet 3/12/2025 3/17/2025 Sreedhar Duff NP    ondansetron (ZOFRAN) 4 MG tablet Take 1 tablet (4 mg total) by mouth every 6 (six) hours. 12 tablet  3/12/2025 -- Sreedhar Duff NP    tamsulosin (FLOMAX) 0.4 mg Cap Take 1 capsule (0.4 mg total) by mouth once daily. 10 capsule 3/12/2025 3/12/2026 Sreedhar Duff NP          Follow-up Information       Follow up With Specialties Details Why Contact Info    [facility]  UROLOGY Urology Schedule an appointment as soon as possible for a visit in 2 days  [address] [facility] [address] [phone]               [1]   Social History  Tobacco Use    Smoking status: Never    Smokeless tobacco: Never   Substance Use Topics    Alcohol use: Yes     Comment: OCCASS    Drug use: Never        Sreedhar Duff NP  03/14/25 6825

## 2025-03-18 ENCOUNTER — OFFICE VISIT (OUTPATIENT)
Dept: OBSTETRICS AND GYNECOLOGY | Facility: CLINIC | Age: 40
End: 2025-03-18
Payer: MEDICAID

## 2025-03-18 ENCOUNTER — PATIENT MESSAGE (OUTPATIENT)
Dept: OBSTETRICS AND GYNECOLOGY | Facility: CLINIC | Age: 40
End: 2025-03-18

## 2025-03-18 VITALS
DIASTOLIC BLOOD PRESSURE: 72 MMHG | SYSTOLIC BLOOD PRESSURE: 130 MMHG | WEIGHT: 132.25 LBS | BODY MASS INDEX: 24.97 KG/M2 | HEIGHT: 61 IN

## 2025-03-18 DIAGNOSIS — N83.202 LEFT OVARIAN CYST: Primary | ICD-10-CM

## 2025-03-18 PROCEDURE — 99999 PR PBB SHADOW E&M-EST. PATIENT-LVL III: CPT | Mod: PBBFAC,,,

## 2025-03-18 PROCEDURE — 3075F SYST BP GE 130 - 139MM HG: CPT | Mod: CPTII,,,

## 2025-03-18 PROCEDURE — 1160F RVW MEDS BY RX/DR IN RCRD: CPT | Mod: CPTII,,,

## 2025-03-18 PROCEDURE — 3078F DIAST BP <80 MM HG: CPT | Mod: CPTII,,,

## 2025-03-18 PROCEDURE — 99213 OFFICE O/P EST LOW 20 MIN: CPT | Mod: PBBFAC

## 2025-03-18 PROCEDURE — 1159F MED LIST DOCD IN RCRD: CPT | Mod: CPTII,,,

## 2025-03-18 PROCEDURE — 3008F BODY MASS INDEX DOCD: CPT | Mod: CPTII,,,

## 2025-03-18 PROCEDURE — 99213 OFFICE O/P EST LOW 20 MIN: CPT | Mod: S$PBB,,,

## 2025-03-19 NOTE — PROGRESS NOTES
Subjective:       Patient ID: Betty Felipe is a 39 y.o. female.    Chief Complaint:  Well Woman      History of Present Illness  HPI    Patient presents with questions regarding recent imaging, she had renal stone study performed and a cyst was seen on her left ovary.  Patient denies left sided pelvic pain   Patient reports cycles are now monthly, lasting 2-3 days    GYN & OB History  Patient's last menstrual period was 2025.   Date of Last Pap: 2/3/2023    OB History    Para Term  AB Living   2 1 1   1   SAB IAB Ectopic Multiple Live Births      0 1      # Outcome Date GA Lbr Soham/2nd Weight Sex Type Anes PTL Lv   2             1 Term 23 41w0d  3.82 kg (8 lb 6.8 oz) M CS-LTranv EPI, Spinal N JUANY      Complications: Fetal Intolerance, Failure to progress in labor       Review of Systems  Review of Systems   Constitutional: Negative.    HENT: Negative.     Eyes: Negative.    Respiratory: Negative.     Cardiovascular: Negative.    Gastrointestinal: Negative.    Genitourinary: Negative.    Musculoskeletal: Negative.    Integumentary:  Negative.   Neurological: Negative.    Hematological: Negative.    Psychiatric/Behavioral: Negative.     All other systems reviewed and are negative.  Breast: negative.            Objective:      Physical Exam:   Constitutional: She is oriented to person, place, and time. She appears well-developed and well-nourished.    HENT:   Head: Normocephalic and atraumatic.    Eyes: Pupils are equal, round, and reactive to light. Conjunctivae and EOM are normal.     Cardiovascular:  Normal rate, regular rhythm and normal heart sounds.             Pulmonary/Chest: Effort normal.        Abdominal: Soft. There is no abdominal tenderness.     Genitourinary:    Genitourinary Comments: deferred             Musculoskeletal: Normal range of motion and moves all extremeties.       Neurological: She is alert and oriented to person, place, and time.    Skin: Skin is warm and  dry. She is not diaphoretic.    Psychiatric: She has a normal mood and affect. Her behavior is normal. Judgment and thought content normal.             Assessment:        1. Left ovarian cyst               Plan:   Repeat pelvic US scheduled in 6 weeks     Diagnosis and orders this visit:  Left ovarian cyst  -     US Pelvis Comp with Transvag NON-OB (xpd; Future; Expected date: 03/18/2025        Kristan Matson, NP

## 2025-03-20 ENCOUNTER — OFFICE VISIT (OUTPATIENT)
Dept: UROLOGY | Facility: CLINIC | Age: 40
End: 2025-03-20
Payer: MEDICAID

## 2025-03-20 VITALS
WEIGHT: 130 LBS | HEART RATE: 73 BPM | HEIGHT: 61 IN | DIASTOLIC BLOOD PRESSURE: 83 MMHG | BODY MASS INDEX: 24.55 KG/M2 | SYSTOLIC BLOOD PRESSURE: 124 MMHG

## 2025-03-20 DIAGNOSIS — N20.1 RIGHT URETERAL STONE: Primary | ICD-10-CM

## 2025-03-20 PROBLEM — M25.662 DECREASED RANGE OF MOTION OF BOTH LOWER EXTREMITIES: Status: RESOLVED | Noted: 2023-11-08 | Resolved: 2025-03-20

## 2025-03-20 PROBLEM — L70.0 ACNE VULGARIS: Status: RESOLVED | Noted: 2024-05-06 | Resolved: 2025-03-20

## 2025-03-20 PROBLEM — R29.898 DECREASED STRENGTH OF LOWER EXTREMITY: Status: RESOLVED | Noted: 2023-11-08 | Resolved: 2025-03-20

## 2025-03-20 PROBLEM — M25.661 DECREASED RANGE OF MOTION OF BOTH LOWER EXTREMITIES: Status: RESOLVED | Noted: 2023-11-08 | Resolved: 2025-03-20

## 2025-03-20 PROCEDURE — 1159F MED LIST DOCD IN RCRD: CPT | Mod: CPTII,,, | Performed by: NURSE PRACTITIONER

## 2025-03-20 PROCEDURE — 87086 URINE CULTURE/COLONY COUNT: CPT | Performed by: NURSE PRACTITIONER

## 2025-03-20 PROCEDURE — 87088 URINE BACTERIA CULTURE: CPT | Performed by: NURSE PRACTITIONER

## 2025-03-20 PROCEDURE — 87186 SC STD MICRODIL/AGAR DIL: CPT | Performed by: NURSE PRACTITIONER

## 2025-03-20 PROCEDURE — 99214 OFFICE O/P EST MOD 30 MIN: CPT | Mod: PBBFAC | Performed by: NURSE PRACTITIONER

## 2025-03-20 PROCEDURE — 99999 PR PBB SHADOW E&M-EST. PATIENT-LVL IV: CPT | Mod: PBBFAC,,, | Performed by: NURSE PRACTITIONER

## 2025-03-20 PROCEDURE — 3074F SYST BP LT 130 MM HG: CPT | Mod: CPTII,,, | Performed by: NURSE PRACTITIONER

## 2025-03-20 PROCEDURE — 99214 OFFICE O/P EST MOD 30 MIN: CPT | Mod: S$PBB,,, | Performed by: NURSE PRACTITIONER

## 2025-03-20 PROCEDURE — 3008F BODY MASS INDEX DOCD: CPT | Mod: CPTII,,, | Performed by: NURSE PRACTITIONER

## 2025-03-20 PROCEDURE — 3079F DIAST BP 80-89 MM HG: CPT | Mod: CPTII,,, | Performed by: NURSE PRACTITIONER

## 2025-03-20 NOTE — PROGRESS NOTES
Chief Complaint:   Right hydroureteronephrosis with 2 mm distal right ureteral/UVJ stone.     HPI:   Patient is a 39-year-old female that presented to the ED with an acute onset of right flank pain.  Was found to have a 2 mm distal right ureteral stone at the UVJ, right hydronephrosis.  Patient states she does not have a history of renal stones.  Urine in clinic indicates trace leukocytes and moderate blood, all other parameters are negative.  Reports that symptoms have greatly improved over the last 48 hours.  Denies gross hematuria or fever.  Allergies:  Hay fever and allergy relief    Medications:  has a current medication list which includes the following prescription(s): alprazolam, benzoyl peroxide 10%, ferrous sulfate, fluoxetine, meloxicam, ondansetron, tamsulosin, and tretinoin.    Review of Systems:  General: No fever, chills, fatigability, or weight loss.  Skin: No rashes, itching, or changes in color or texture of skin.  Chest: Denies JOHNSON, cyanosis, wheezing, cough, and sputum production.  Abdomen: Appetite fine. No weight loss. Denies diarrhea, abdominal pain, hematemesis, or blood in stool.  Musculoskeletal: No joint stiffness or swelling. Denies back pain.  : As above.  All other review of systems negative.    PMH:  Renal stones    PSH:   has a past surgical history that includes Dental surgery and  section (N/A, 2023).    FamHx: none    SocHx:  reports that she has never smoked. She has never used smokeless tobacco. She reports current alcohol use. She reports that she does not use drugs.      Physical Exam:  General: A&Ox3, no apparent distress, no deformities  Neck: No masses, normal thyroid  Lungs: normal inspiration, no use of accessory muscles  Heart: normal pulse, no arrhythmias  Abdomen: Soft, NT, ND, no masses, no hernias, no hepatosplenomegaly  Lymphatic: Neck and groin nodes negative  Skin: The skin is warm and dry. No jaundice.  Ext: No c/c/e.      Labs/Studies:    03/12/2025  EXAM:  CT RENAL STONE STUDY ABD PELVIS WO     CLINICAL HISTORY:  Right flank pain.     COMPARISON: None.     TECHNIQUE:  Limited noncontrast CT scan of the abdomen and pelvis.  All CT scans at [this location] are performed using dose modulation techniques as appropriate to a performed exam including the following: automated exposure control; adjustment of the mA and/or kV according to patient size (this includes techniques or standardized protocols for targeted exams where dose is matched to indication / reason for exam; i.e. extremities or head); use of iterative reconstruction technique.     FINDINGS:  Lung bases are clear.     The liver and spleen appear normal.     The gallbladder is present.     The left kidney demonstrates a punctate 1 mm calcification in the midpole.     The right kidney is prominent with perinephric stranding and hydronephrosis.  There are at least 2 calcifications of the right kidney the larger 2 mm in size in the lower pole.  The right ureter is distended with periureteral stranding.  There is a 2 mm stone in the deep right pelvis anteriorly probably a distal right ureteral stone.     The uterus is present.     In the left adnexa there is a cystic area probably a left ovarian cyst 4.3 cm in size.     The pancreas is normal.     The bowel loops are normal.     No free fluid.     The aorta and IVC appear grossly normal.  Impression:Bilateral nephrolithiasis.  Right hydroureteronephrosis with 2 mm distal right ureteral/UVJ stone.  Left adnexal cyst.     Normal appendix.    Impression/Plan:   Right ureteral stone  Patient was given a strainer.  Urine sent for culture.  Will proceed with renal ultrasound and KUB to assess for resolution of ureteral stone.  Patient will be contacted with results and needed follow-up.

## 2025-03-21 ENCOUNTER — HOSPITAL ENCOUNTER (OUTPATIENT)
Dept: RADIOLOGY | Facility: HOSPITAL | Age: 40
Discharge: HOME | End: 2025-03-21
Attending: NURSE PRACTITIONER
Payer: MEDICAID

## 2025-03-21 ENCOUNTER — PATIENT MESSAGE (OUTPATIENT)
Dept: UROLOGY | Facility: CLINIC | Age: 40
End: 2025-03-21
Payer: MEDICAID

## 2025-03-21 DIAGNOSIS — N20.1 RIGHT URETERAL STONE: ICD-10-CM

## 2025-03-21 PROCEDURE — 76770 US EXAM ABDO BACK WALL COMP: CPT | Mod: TC

## 2025-03-21 PROCEDURE — 74018 RADEX ABDOMEN 1 VIEW: CPT | Mod: 26,,, | Performed by: RADIOLOGY

## 2025-03-21 PROCEDURE — 74018 RADEX ABDOMEN 1 VIEW: CPT | Mod: TC

## 2025-03-21 PROCEDURE — 76770 US EXAM ABDO BACK WALL COMP: CPT | Mod: 26,,, | Performed by: RADIOLOGY

## 2025-03-22 LAB — BACTERIA UR CULT: ABNORMAL

## 2025-03-24 ENCOUNTER — TELEPHONE (OUTPATIENT)
Dept: UROLOGY | Facility: CLINIC | Age: 40
End: 2025-03-24

## 2025-03-24 ENCOUNTER — RESULTS FOLLOW-UP (OUTPATIENT)
Dept: UROLOGY | Facility: CLINIC | Age: 40
End: 2025-03-24

## 2025-03-24 RX ORDER — NITROFURANTOIN 25; 75 MG/1; MG/1
100 CAPSULE ORAL 2 TIMES DAILY
Qty: 20 CAPSULE | Refills: 0 | Status: SHIPPED | OUTPATIENT
Start: 2025-03-24 | End: 2025-03-25

## 2025-03-24 NOTE — TELEPHONE ENCOUNTER
Per Ms. Beck, NP today's visit canceled.  24 hour urine requisition faxed to [facility]; pt notified via portal.  Praveena Caputo LPN

## 2025-03-25 RX ORDER — CIPROFLOXACIN 500 MG/1
500 TABLET ORAL 2 TIMES DAILY
Qty: 20 TABLET | Refills: 0 | Status: SHIPPED | OUTPATIENT
Start: 2025-03-25 | End: 2025-04-04

## 2025-04-09 ENCOUNTER — HOSPITAL ENCOUNTER (OUTPATIENT)
Dept: RADIOLOGY | Facility: HOSPITAL | Age: 40
Discharge: HOME | End: 2025-04-09
Payer: MEDICAID

## 2025-04-09 DIAGNOSIS — N83.202 LEFT OVARIAN CYST: ICD-10-CM

## 2025-04-09 PROCEDURE — 76856 US EXAM PELVIC COMPLETE: CPT | Mod: 26,,, | Performed by: RADIOLOGY

## 2025-04-09 PROCEDURE — 76830 TRANSVAGINAL US NON-OB: CPT | Mod: 26,,, | Performed by: RADIOLOGY

## 2025-04-09 PROCEDURE — 76830 TRANSVAGINAL US NON-OB: CPT | Mod: TC

## 2025-04-15 ENCOUNTER — RESULTS FOLLOW-UP (OUTPATIENT)
Dept: OBSTETRICS AND GYNECOLOGY | Facility: CLINIC | Age: 40
End: 2025-04-15

## 2025-05-02 ENCOUNTER — PATIENT MESSAGE (OUTPATIENT)
Dept: UROLOGY | Facility: CLINIC | Age: 40
End: 2025-05-02
Payer: MEDICAID

## (undated) DEVICE — DRESSING COVER AQUACEL AG SURG

## (undated) DEVICE — PAD ABD 8X10 STERILE

## (undated) DEVICE — TAPE SILK 3IN

## (undated) DEVICE — TAPE SURG MEDIPORE 6X72IN